# Patient Record
Sex: MALE | Race: WHITE | NOT HISPANIC OR LATINO | Employment: OTHER | URBAN - NONMETROPOLITAN AREA
[De-identification: names, ages, dates, MRNs, and addresses within clinical notes are randomized per-mention and may not be internally consistent; named-entity substitution may affect disease eponyms.]

---

## 2024-11-19 ENCOUNTER — HOSPITAL ENCOUNTER (OUTPATIENT)
Facility: HOSPITAL | Age: 74
Discharge: LEFT BEFORE DISCHARGE | End: 2024-11-20
Attending: EMERGENCY MEDICINE | Admitting: INTERNAL MEDICINE
Payer: MEDICARE

## 2024-11-19 ENCOUNTER — APPOINTMENT (OUTPATIENT)
Dept: CT IMAGING | Facility: HOSPITAL | Age: 74
End: 2024-11-19
Payer: MEDICARE

## 2024-11-19 DIAGNOSIS — K31.89 GASTRIC MASS: ICD-10-CM

## 2024-11-19 DIAGNOSIS — K92.0 COFFEE GROUND EMESIS: ICD-10-CM

## 2024-11-19 DIAGNOSIS — R93.3 ABNORMAL FINDING ON GI TRACT IMAGING: ICD-10-CM

## 2024-11-19 DIAGNOSIS — K31.89 GASTRIC WALL THICKENING: Primary | ICD-10-CM

## 2024-11-19 LAB
ALBUMIN SERPL-MCNC: 4 G/DL (ref 3.5–5.2)
ALBUMIN/GLOB SERPL: 1.6 G/DL
ALP SERPL-CCNC: 88 U/L (ref 39–117)
ALT SERPL W P-5'-P-CCNC: 6 U/L (ref 1–41)
ANION GAP SERPL CALCULATED.3IONS-SCNC: 13.2 MMOL/L (ref 5–15)
AST SERPL-CCNC: 16 U/L (ref 1–40)
BACTERIA UR QL AUTO: NORMAL /HPF
BASOPHILS # BLD AUTO: 0.05 10*3/MM3 (ref 0–0.2)
BASOPHILS NFR BLD AUTO: 0.9 % (ref 0–1.5)
BILIRUB SERPL-MCNC: 0.4 MG/DL (ref 0–1.2)
BILIRUB UR QL STRIP: NEGATIVE
BUN SERPL-MCNC: 10 MG/DL (ref 8–23)
BUN/CREAT SERPL: 15.6 (ref 7–25)
CALCIUM SPEC-SCNC: 9 MG/DL (ref 8.6–10.5)
CHLORIDE SERPL-SCNC: 101 MMOL/L (ref 98–107)
CLARITY UR: CLEAR
CO2 SERPL-SCNC: 22.8 MMOL/L (ref 22–29)
COLOR UR: YELLOW
CREAT SERPL-MCNC: 0.64 MG/DL (ref 0.76–1.27)
D-LACTATE SERPL-SCNC: 1.6 MMOL/L (ref 0.5–2)
DEPRECATED RDW RBC AUTO: 58.2 FL (ref 37–54)
EGFRCR SERPLBLD CKD-EPI 2021: 99.3 ML/MIN/1.73
EOSINOPHIL # BLD AUTO: 0.08 10*3/MM3 (ref 0–0.4)
EOSINOPHIL NFR BLD AUTO: 1.4 % (ref 0.3–6.2)
ERYTHROCYTE [DISTWIDTH] IN BLOOD BY AUTOMATED COUNT: 20 % (ref 12.3–15.4)
GLOBULIN UR ELPH-MCNC: 2.5 GM/DL
GLUCOSE SERPL-MCNC: 92 MG/DL (ref 65–99)
GLUCOSE UR STRIP-MCNC: ABNORMAL MG/DL
HCT VFR BLD AUTO: 43 % (ref 37.5–51)
HEMOCCULT STL QL: POSITIVE
HGB BLD-MCNC: 13.6 G/DL (ref 13–17.7)
HGB UR QL STRIP.AUTO: NEGATIVE
HOLD SPECIMEN: NORMAL
HOLD SPECIMEN: NORMAL
HYALINE CASTS UR QL AUTO: NORMAL /LPF
IMM GRANULOCYTES # BLD AUTO: 0.01 10*3/MM3 (ref 0–0.05)
IMM GRANULOCYTES NFR BLD AUTO: 0.2 % (ref 0–0.5)
KETONES UR QL STRIP: ABNORMAL
LEUKOCYTE ESTERASE UR QL STRIP.AUTO: NEGATIVE
LIPASE SERPL-CCNC: 31 U/L (ref 13–60)
LYMPHOCYTES # BLD AUTO: 1.63 10*3/MM3 (ref 0.7–3.1)
LYMPHOCYTES NFR BLD AUTO: 29 % (ref 19.6–45.3)
MCH RBC QN AUTO: 25.7 PG (ref 26.6–33)
MCHC RBC AUTO-ENTMCNC: 31.6 G/DL (ref 31.5–35.7)
MCV RBC AUTO: 81.1 FL (ref 79–97)
MONOCYTES # BLD AUTO: 0.67 10*3/MM3 (ref 0.1–0.9)
MONOCYTES NFR BLD AUTO: 11.9 % (ref 5–12)
NEUTROPHILS NFR BLD AUTO: 3.18 10*3/MM3 (ref 1.7–7)
NEUTROPHILS NFR BLD AUTO: 56.6 % (ref 42.7–76)
NITRITE UR QL STRIP: NEGATIVE
NRBC BLD AUTO-RTO: 0 /100 WBC (ref 0–0.2)
PH UR STRIP.AUTO: >=9 [PH] (ref 5–8)
PLATELET # BLD AUTO: 171 10*3/MM3 (ref 140–450)
PMV BLD AUTO: 9.9 FL (ref 6–12)
POTASSIUM SERPL-SCNC: 4.1 MMOL/L (ref 3.5–5.2)
PROT SERPL-MCNC: 6.5 G/DL (ref 6–8.5)
PROT UR QL STRIP: ABNORMAL
RBC # BLD AUTO: 5.3 10*6/MM3 (ref 4.14–5.8)
RBC # UR STRIP: NORMAL /HPF
REF LAB TEST METHOD: NORMAL
SODIUM SERPL-SCNC: 137 MMOL/L (ref 136–145)
SP GR UR STRIP: 1.02 (ref 1–1.03)
SQUAMOUS #/AREA URNS HPF: NORMAL /HPF
UROBILINOGEN UR QL STRIP: ABNORMAL
WBC # UR STRIP: NORMAL /HPF
WBC NRBC COR # BLD AUTO: 5.62 10*3/MM3 (ref 3.4–10.8)
WHOLE BLOOD HOLD COAG: NORMAL
WHOLE BLOOD HOLD SPECIMEN: NORMAL

## 2024-11-19 PROCEDURE — G0378 HOSPITAL OBSERVATION PER HR: HCPCS

## 2024-11-19 PROCEDURE — 83605 ASSAY OF LACTIC ACID: CPT | Performed by: EMERGENCY MEDICINE

## 2024-11-19 PROCEDURE — 82272 OCCULT BLD FECES 1-3 TESTS: CPT | Performed by: EMERGENCY MEDICINE

## 2024-11-19 PROCEDURE — 83690 ASSAY OF LIPASE: CPT | Performed by: EMERGENCY MEDICINE

## 2024-11-19 PROCEDURE — 99223 1ST HOSP IP/OBS HIGH 75: CPT | Performed by: FAMILY MEDICINE

## 2024-11-19 PROCEDURE — 80053 COMPREHEN METABOLIC PANEL: CPT | Performed by: EMERGENCY MEDICINE

## 2024-11-19 PROCEDURE — 74177 CT ABD & PELVIS W/CONTRAST: CPT

## 2024-11-19 PROCEDURE — 85025 COMPLETE CBC W/AUTO DIFF WBC: CPT | Performed by: EMERGENCY MEDICINE

## 2024-11-19 PROCEDURE — 81001 URINALYSIS AUTO W/SCOPE: CPT | Performed by: EMERGENCY MEDICINE

## 2024-11-19 PROCEDURE — 99285 EMERGENCY DEPT VISIT HI MDM: CPT | Performed by: EMERGENCY MEDICINE

## 2024-11-19 PROCEDURE — 96374 THER/PROPH/DIAG INJ IV PUSH: CPT

## 2024-11-19 PROCEDURE — 25510000001 IOPAMIDOL 61 % SOLUTION: Performed by: EMERGENCY MEDICINE

## 2024-11-19 RX ORDER — AMOXICILLIN 250 MG
2 CAPSULE ORAL 2 TIMES DAILY PRN
Status: DISCONTINUED | OUTPATIENT
Start: 2024-11-19 | End: 2024-11-20 | Stop reason: HOSPADM

## 2024-11-19 RX ORDER — SODIUM CHLORIDE 0.9 % (FLUSH) 0.9 %
10 SYRINGE (ML) INJECTION AS NEEDED
Status: DISCONTINUED | OUTPATIENT
Start: 2024-11-19 | End: 2024-11-20 | Stop reason: HOSPADM

## 2024-11-19 RX ORDER — ZOLPIDEM TARTRATE 5 MG/1
10 TABLET ORAL NIGHTLY PRN
Status: DISCONTINUED | OUTPATIENT
Start: 2024-11-19 | End: 2024-11-20 | Stop reason: HOSPADM

## 2024-11-19 RX ORDER — CLONAZEPAM 0.5 MG/1
0.5 TABLET ORAL 2 TIMES DAILY PRN
Status: DISCONTINUED | OUTPATIENT
Start: 2024-11-19 | End: 2024-11-20 | Stop reason: HOSPADM

## 2024-11-19 RX ORDER — SUCRALFATE 1 G/1
1 TABLET ORAL 4 TIMES DAILY
COMMUNITY

## 2024-11-19 RX ORDER — SODIUM CHLORIDE 0.9 % (FLUSH) 0.9 %
10 SYRINGE (ML) INJECTION EVERY 12 HOURS SCHEDULED
Status: DISCONTINUED | OUTPATIENT
Start: 2024-11-19 | End: 2024-11-20 | Stop reason: HOSPADM

## 2024-11-19 RX ORDER — ROSUVASTATIN CALCIUM 5 MG/1
10 TABLET, COATED ORAL NIGHTLY
COMMUNITY

## 2024-11-19 RX ORDER — SODIUM CHLORIDE 9 MG/ML
40 INJECTION, SOLUTION INTRAVENOUS AS NEEDED
Status: DISCONTINUED | OUTPATIENT
Start: 2024-11-19 | End: 2024-11-20 | Stop reason: HOSPADM

## 2024-11-19 RX ORDER — ACETAMINOPHEN 650 MG/1
650 SUPPOSITORY RECTAL EVERY 4 HOURS PRN
Status: DISCONTINUED | OUTPATIENT
Start: 2024-11-19 | End: 2024-11-20 | Stop reason: HOSPADM

## 2024-11-19 RX ORDER — ONDANSETRON 2 MG/ML
4 INJECTION INTRAMUSCULAR; INTRAVENOUS EVERY 6 HOURS PRN
Status: DISCONTINUED | OUTPATIENT
Start: 2024-11-19 | End: 2024-11-20 | Stop reason: HOSPADM

## 2024-11-19 RX ORDER — BISACODYL 5 MG/1
5 TABLET, DELAYED RELEASE ORAL DAILY PRN
Status: DISCONTINUED | OUTPATIENT
Start: 2024-11-19 | End: 2024-11-20 | Stop reason: HOSPADM

## 2024-11-19 RX ORDER — PANTOPRAZOLE SODIUM 40 MG/1
40 TABLET, DELAYED RELEASE ORAL DAILY
COMMUNITY

## 2024-11-19 RX ORDER — ESCITALOPRAM OXALATE 20 MG/1
20 TABLET ORAL EVERY EVENING
Status: DISCONTINUED | OUTPATIENT
Start: 2024-11-19 | End: 2024-11-19

## 2024-11-19 RX ORDER — ROSUVASTATIN CALCIUM 5 MG/1
10 TABLET, COATED ORAL NIGHTLY
Status: DISCONTINUED | OUTPATIENT
Start: 2024-11-19 | End: 2024-11-20 | Stop reason: HOSPADM

## 2024-11-19 RX ORDER — ESCITALOPRAM OXALATE 20 MG/1
20 TABLET ORAL NIGHTLY
Status: DISCONTINUED | OUTPATIENT
Start: 2024-11-19 | End: 2024-11-20 | Stop reason: HOSPADM

## 2024-11-19 RX ORDER — ZOLPIDEM TARTRATE 5 MG/1
10 TABLET ORAL NIGHTLY PRN
COMMUNITY

## 2024-11-19 RX ORDER — BISACODYL 10 MG
10 SUPPOSITORY, RECTAL RECTAL DAILY PRN
Status: DISCONTINUED | OUTPATIENT
Start: 2024-11-19 | End: 2024-11-20 | Stop reason: HOSPADM

## 2024-11-19 RX ORDER — PANTOPRAZOLE SODIUM 40 MG/10ML
40 INJECTION, POWDER, LYOPHILIZED, FOR SOLUTION INTRAVENOUS
Status: DISCONTINUED | OUTPATIENT
Start: 2024-11-19 | End: 2024-11-20 | Stop reason: HOSPADM

## 2024-11-19 RX ORDER — METOPROLOL SUCCINATE 25 MG/1
25 TABLET, EXTENDED RELEASE ORAL DAILY
Status: DISCONTINUED | OUTPATIENT
Start: 2024-11-19 | End: 2024-11-20 | Stop reason: HOSPADM

## 2024-11-19 RX ORDER — IOPAMIDOL 612 MG/ML
100 INJECTION, SOLUTION INTRAVASCULAR
Status: COMPLETED | OUTPATIENT
Start: 2024-11-19 | End: 2024-11-19

## 2024-11-19 RX ORDER — ACETAMINOPHEN 160 MG/5ML
650 SOLUTION ORAL EVERY 4 HOURS PRN
Status: DISCONTINUED | OUTPATIENT
Start: 2024-11-19 | End: 2024-11-20 | Stop reason: HOSPADM

## 2024-11-19 RX ORDER — CLONAZEPAM 0.5 MG/1
0.5 TABLET ORAL 2 TIMES DAILY PRN
COMMUNITY

## 2024-11-19 RX ORDER — ACETAMINOPHEN 325 MG/1
650 TABLET ORAL EVERY 4 HOURS PRN
Status: DISCONTINUED | OUTPATIENT
Start: 2024-11-19 | End: 2024-11-20 | Stop reason: HOSPADM

## 2024-11-19 RX ORDER — METOPROLOL SUCCINATE 25 MG/1
25 TABLET, EXTENDED RELEASE ORAL DAILY
COMMUNITY

## 2024-11-19 RX ORDER — ESCITALOPRAM OXALATE 5 MG/1
20 TABLET ORAL DAILY
COMMUNITY

## 2024-11-19 RX ORDER — POLYETHYLENE GLYCOL 3350 17 G/17G
17 POWDER, FOR SOLUTION ORAL DAILY PRN
Status: DISCONTINUED | OUTPATIENT
Start: 2024-11-19 | End: 2024-11-20 | Stop reason: HOSPADM

## 2024-11-19 RX ADMIN — IOPAMIDOL 100 ML: 612 INJECTION, SOLUTION INTRAVENOUS at 13:21

## 2024-11-19 RX ADMIN — Medication 10 ML: at 20:29

## 2024-11-19 RX ADMIN — METOPROLOL SUCCINATE 25 MG: 25 TABLET, EXTENDED RELEASE ORAL at 20:26

## 2024-11-19 RX ADMIN — ZOLPIDEM TARTRATE 10 MG: 5 TABLET ORAL at 20:27

## 2024-11-19 RX ADMIN — CLONAZEPAM 0.5 MG: 0.5 TABLET ORAL at 20:27

## 2024-11-19 RX ADMIN — ROSUVASTATIN CALCIUM 10 MG: 5 TABLET, COATED ORAL at 20:27

## 2024-11-19 RX ADMIN — PANTOPRAZOLE SODIUM 40 MG: 40 INJECTION, POWDER, FOR SOLUTION INTRAVENOUS at 17:58

## 2024-11-19 RX ADMIN — ESCITALOPRAM OXALATE 20 MG: 20 TABLET ORAL at 20:28

## 2024-11-19 NOTE — PLAN OF CARE
Problem: Adult Inpatient Plan of Care  Goal: Plan of Care Review  Outcome: Progressing  Goal: Patient-Specific Goal (Individualized)  Outcome: Progressing  Goal: Absence of Hospital-Acquired Illness or Injury  Outcome: Progressing  Intervention: Identify and Manage Fall Risk  Recent Flowsheet Documentation  Taken 11/19/2024 1639 by Chester Deutsch RN  Safety Promotion/Fall Prevention: safety round/check completed  Intervention: Prevent Skin Injury  Recent Flowsheet Documentation  Taken 11/19/2024 1639 by Chester Deutsch RN  Body Position:   tilted   left   position changed independently  Skin Protection: incontinence pads utilized  Goal: Optimal Comfort and Wellbeing  Outcome: Progressing  Intervention: Provide Person-Centered Care  Recent Flowsheet Documentation  Taken 11/19/2024 1639 by Chester Deutsch RN  Trust Relationship/Rapport:   care explained   choices provided   empathic listening provided   emotional support provided   questions answered   questions encouraged   reassurance provided   thoughts/feelings acknowledged  Goal: Readiness for Transition of Care  Outcome: Progressing  Intervention: Mutually Develop Transition Plan  Recent Flowsheet Documentation  Taken 11/19/2024 1614 by Chester Deutsch RN  Equipment Currently Used at Home: none  Transportation Anticipated: family or friend will provide  Patient/Family Anticipated Services at Transition: none  Patient/Family Anticipates Transition to: home with family     Problem: Hospitalized Older Adult  Goal: Optimal Coping  Outcome: Progressing  Intervention: Promote Psychosocial Wellbeing  Recent Flowsheet Documentation  Taken 11/19/2024 1639 by Chester Deutsch RN  Family/Support System Care: support provided  Goal: Improved Oral Intake  Outcome: Progressing  Goal: Adequate Sleep/Rest  Outcome: Progressing     Problem: Diabetes  Goal: Optimal Coping  Outcome: Progressing  Intervention: Support Wellbeing and Self-Management Success  Recent Flowsheet  Documentation  Taken 11/19/2024 1639 by Chester Deutsch, RN  Family/Support System Care: support provided  Goal: Optimal Functional Ability  Outcome: Progressing  Intervention: Optimize Functional Ability  Recent Flowsheet Documentation  Taken 11/19/2024 1639 by Chester Deutsch, RN  Activity Management: activity encouraged  Goal: Blood Glucose Level Within Target Range  Outcome: Progressing  Goal: Minimize Hypoglycemia Risk  Outcome: Progressing   Goal Outcome Evaluation:   New ED admit. VSS on RA. Possible EGD tomorrow, NPO after midnight. No complaints from patient at this time. Family remains at bedside.

## 2024-11-19 NOTE — H&P
[facility]      Name:  Raffaele Blanco   Age:  73 y.o.  Sex:  male  :  1950  MRN:  [id number]   Visit Number:  [id number]  Admission Date:  2024  Date Of Service:  24  Primary Care Physician:  Bernardino Santiago MD    Chief Complaint:     Coffee-ground emesis, dark stool    History Of Presenting Illness:      Patient is 73 years old male with a past medical history of diabetes and hypertension who presented to the ER with his daughter and niece with a chief complaint of coffee-ground emesis and dark stool for over the past 2 months.  Patient also reporting unintentional weight loss of over 30 pounds recently.  He had hernia surgery in an outside institution 3 months ago and has been declining since after the surgery.  He reports that he has chronic constipation for which he uses Linzess. Patient does describe vomiting initially coffee ground per patient's daughter.  Patient also endorsing feeling generally weak and fatigued. Denies any abdominal pain, fever or shortness of breath.  He has a history of anemia for which is PCP put him on iron pills and he was also treated for acid reflux.    On ER evaluation, his vitals are stable and afebrile on room air.  His hemoglobin stable at 13.6, CMP and CBC nonactionable.  Fecal occult blood positive.  CT abdomen pelvis showed Severe wall thickening of the gastric pylorus over a 5 cm length. Morphology favors neoplastic etiology over inflammatory. EGD highly recommended. No associated findings of gastric outlet obstruction.  Gastroenterology Dr. Mccord consulted by ER provider, recommended admission for EGD in a.m. n.p.o. after midnight.  Hospitalist consulted for admission.    Review Of Systems:    All systems were reviewed and negative except as mentioned in history of presenting illness, assessment and plan.    Past Medical History: Patient  has a past medical history of Diabetes mellitus and  "Hypertension.    Past Surgical History: Patient  has a past surgical history that includes Hernia repair; Fracture surgery; and Colonoscopy.    Social History: Patient  reports that he has never smoked. He has never used smokeless tobacco. He reports that he does not drink alcohol and does not use drugs.    Family History:  Patient's family history has been reviewed and found to be noncontributory.     Allergies:      Patient has no known allergies.    Home Medications:    Prior to Admission Medications       None          ED Medications:    Medications   sodium chloride 0.9 % flush 10 mL (has no administration in time range)   iopamidol (ISOVUE-300) 61 % injection 100 mL (100 mL Intravenous Given 11/19/24 1321)     Vital Signs:  Temp:  [97.6 °F (36.4 °C)] 97.6 °F (36.4 °C)  Heart Rate:  [68-96] 68  Resp:  [12-18] 12  BP: (101-118)/(67-94) 110/74        11/19/24  1058   Weight: 67.7 kg (149 lb 3.2 oz)     Body mass index is 23.37 kg/m².    Physical Exam:     Most recent vital Signs: /74   Pulse 68   Temp 97.6 °F (36.4 °C) (Oral)   Resp 12   Ht 170.2 cm (67\")   Wt 67.7 kg (149 lb 3.2 oz)   SpO2 99%   BMI 23.37 kg/m²     Physical Exam  Vitals and nursing note reviewed.   Constitutional:       General: He is not in acute distress.     Appearance: He is ill-appearing.   HENT:      Head: Normocephalic and atraumatic.      Nose: Nose normal.      Mouth/Throat:      Mouth: Mucous membranes are moist.   Eyes:      Extraocular Movements: Extraocular movements intact.      Conjunctiva/sclera: Conjunctivae normal.      Pupils: Pupils are equal, round, and reactive to light.   Cardiovascular:      Rate and Rhythm: Normal rate and regular rhythm.      Pulses: Normal pulses.      Heart sounds: Normal heart sounds.   Pulmonary:      Effort: Pulmonary effort is normal.      Breath sounds: Normal breath sounds. No wheezing or rhonchi.   Abdominal:      General: Bowel sounds are normal. There is no distension.      " Palpations: Abdomen is soft.      Tenderness: There is no abdominal tenderness.   Musculoskeletal:         General: Normal range of motion.      Cervical back: Normal range of motion and neck supple.      Right lower leg: No edema.      Left lower leg: No edema.   Skin:     General: Skin is warm and dry.      Findings: No rash.   Neurological:      General: No focal deficit present.      Mental Status: He is alert and oriented to person, place, and time. Mental status is at baseline.   Psychiatric:         Mood and Affect: Mood normal.         Behavior: Behavior normal.         Thought Content: Thought content normal.         Laboratory data:    I have reviewed the labs done in the emergency room.    Results from last 7 days   Lab Units 11/19/24  1151   SODIUM mmol/L 137   POTASSIUM mmol/L 4.1   CHLORIDE mmol/L 101   CO2 mmol/L 22.8   BUN mg/dL 10   CREATININE mg/dL 0.64*   CALCIUM mg/dL 9.0   BILIRUBIN mg/dL 0.4   ALK PHOS U/L 88   ALT (SGPT) U/L 6   AST (SGOT) U/L 16   GLUCOSE mg/dL 92     Results from last 7 days   Lab Units 11/19/24  1151   WBC 10*3/mm3 5.62   HEMOGLOBIN g/dL 13.6   HEMATOCRIT % 43.0   PLATELETS 10*3/mm3 171                     Results from last 7 days   Lab Units 11/19/24  1151   LIPASE U/L 31         Results from last 7 days   Lab Units 11/19/24  1200   COLOR UA  Yellow   GLUCOSE UA  500 mg/dL (2+)*   KETONES UA  15 mg/dL (1+)*   BLOOD UA  Negative   LEUKOCYTES UA  Negative   PH, URINE  >=9.0*   BILIRUBIN UA  Negative   UROBILINOGEN UA  1.0 E.U./dL   RBC UA /HPF None Seen   WBC UA /HPF None Seen       Pain Management Panel           No data to display                  Radiology:    CT Abdomen Pelvis With Contrast    Result Date: 11/19/2024  PROCEDURE: CT ABDOMEN PELVIS W CONTRAST-  TECHNIQUE: IV contrast enhanced exam  HISTORY:  rlq abd pain, dark stool, and coffee color vomit  COMPARISON: None.  FINDINGS:  ABDOMEN: Benign cyst is seen of the anterior liver dome. Remaining solid organs are  unremarkable. Marked wall thickening is noted of the gastric pylorus. The anterior wall measures up to 34 mm in thickness over a 50 mm length. This could be neoplastic or inflammatory. This results in narrowing of the pyloric channel. EGD correlation recommended.  There is no significant gastric distention. Small bowel is unremarkable. No adenopathy is present.  PELVIS: Moderate sigmoid diverticulosis is noted. Appendix is not visualized but no secondary findings of appendicitis are present. Bladder and prostate are unremarkable. Surgical changes of bilateral lower inguinal hernia repair are noted.      Severe wall thickening of the gastric pylorus over a 5 cm length. Morphology favors neoplastic etiology over inflammatory. EGD highly recommended. No associated findings of gastric outlet obstruction.  This study was performed with techniques to keep radiation doses as low as reasonably achievable (ALARA). Individualized dose reduction techniques using automated exposure control or adjustment of vA and/or kV according to the patient size were employed.  This report was signed and finalized on 11/19/2024 2:21 PM by Kevin Plummer MD.       Assessment:    Coffee-ground emesis/melena, POA  Severe wall thickening of gastric pylorus, favors neoplastic  Diabetes  Hypertension  Anemia  GERD    Plan:    Patient is admitted for further management and treatment.    Coffee-ground emesis/melena  Severe wall thickening of gastric pylorus  -Dr. Mccord with GI consulted, appreciate recommendations.  -Plan on EGD in a.m.  -Clear liquids today, n.p.o. after midnight  -Monitor hemoglobin and transfuse as indicated  -Continue PPI    -Continue home meds as warranted.  -Further orders as indicated per clinical course.  -Discussed management treatment plan with the patient and his daughter and niece at bedside.  Patient with full decision-making capacity, he does not want to be resuscitated or intubated.  CODE STATUS was ordered as  DNR/DNI per his wishes.    Risk Assessment: Moderate to high  DVT Prophylaxis: SCDs, avoid chemoprophylaxis with possible GI bleed  Code Status: DNR/DNI  Diet: Clear liquids, n.p.o. after midnight    Advance Care Planning   ACP discussion was held with the patient during this visit. Patient does not have an advance directive, information provided.       Melonie Hdz MD  11/19/24  15:43 EST    Dictated utilizing Dragon dictation.

## 2024-11-19 NOTE — ED PROVIDER NOTES
[facility]  Emergency Department Encounter  Emergency Medicine Physician Note     Pt Name:Raffaele Blanco  MRN: [id number]  Birthdate 1950  Date of evaluation: 11/19/2024  PCP:  Bernardino Santiago MD  Note Started: 11:55 AM EST      CHIEF COMPLAINT       Chief Complaint   Patient presents with    Black or Bloody Stool     Pt CO N/V and black or bloody stool. Pt states that he had hernia surgery approximately 3 months ago and has issues ever since.     Vomiting    Nausea       HISTORY OF PRESENT ILLNESS  (Location/Symptom, Timing/Onset, Context/Setting, Quality, Duration, Modifying Factors, Severity.)      Raffaele Blanco is a 73 y.o. male who presents with generalized abdominal pain, dark stool and dark vomiting that is been going on for multiple weeks, patient states is progressively worsened.  Patient states that everything is declined after he had hernia surgery at outside institution 3 months ago.  Patient describes some diarrhea some hard stool, states it is black in color and dark. Patient denies tarry or sticky like.  Patient does describe vomiting initially coffee ground per patient's daughter, has more mucus like.    PAST MEDICAL / SURGICAL / SOCIAL / FAMILY HISTORY     History reviewed. No pertinent past medical history.  No additional pertinent       History reviewed. No pertinent surgical history.  No additional pertinent       Social History     Socioeconomic History    Marital status: Unknown       History reviewed. No pertinent family history.    Allergies:  Patient has no known allergies.    Home Medications:  Prior to Admission medications    Not on File         REVIEW OF SYSTEMS       Review of Systems   Constitutional:  Negative for chills and fever.   Respiratory:  Negative for chest tightness and shortness of breath.    Cardiovascular:  Negative for chest pain.   Gastrointestinal:  Positive for abdominal pain, diarrhea, nausea and vomiting.  "  Genitourinary:  Negative for flank pain.   Neurological:  Negative for dizziness and light-headedness.       PHYSICAL EXAM      INITIAL VITALS:   /74   Pulse 68   Temp 97.6 °F (36.4 °C) (Oral)   Resp 12   Ht 170.2 cm (67\")   Wt 67.7 kg (149 lb 3.2 oz)   SpO2 99%   BMI 23.37 kg/m²     Physical Exam  Constitutional:       Appearance: Normal appearance.   HENT:      Head: Normocephalic and atraumatic.   Eyes:      Extraocular Movements: Extraocular movements intact.      Comments: Pale conjunctiva   Cardiovascular:      Rate and Rhythm: Normal rate and regular rhythm.   Pulmonary:      Effort: Pulmonary effort is normal.      Breath sounds: Normal breath sounds.   Abdominal:      General: Abdomen is flat.      Palpations: Abdomen is soft.      Tenderness: There is abdominal tenderness.      Comments: Surgical site appears noninfected.   Musculoskeletal:      Right lower leg: No edema.      Left lower leg: No edema.   Skin:     General: Skin is warm and dry.   Neurological:      General: No focal deficit present.      Mental Status: He is alert and oriented to person, place, and time.   Psychiatric:         Mood and Affect: Mood normal.         Behavior: Behavior normal.           DDX/DIAGNOSTIC RESULTS / EMERGENCY DEPARTMENT COURSE / MDM     Differential Diagnosis included but not limited: GI bleed, liver disease, cirrhosis, peptic ulcer, neoplastic disease    Diagnoses Considered but Do Not Suspect: Sepsis or severe infectious process.    Decision Rules/Scores utilized: N/A     Tests considered but not ordered and why:  N/A     MIPS: N/A     Code Status Discussion:  Not Discussed    Additional Patient Education Provided: None     Medical Decision Making    Medical Decision Making   patient is a 73-year-old male presenting with coffee-ground emesis and dark stools that have been going on for couple weeks.  Patient states that he had hernia surgery 3 months ago at outside institution and since then has " had declining status.  Patient has most of his workup in other institutions with nothing in care everywhere or historically here.  Patient does report having a colonoscopy many years ago.  Laboratory workup grossly normal with normal BUN, normal hemoglobin.  Concern for neoplasm in the gastric area noted.  Discussed patient's case with GI who would like to perform EGD tomorrow.  Did inform patient of the results of CT scan, did inform him to be n.p.o. at midnight.  Patient admitted to hospitalist group for further management and care and EGD tomorrow by GI    Problems Addressed:  Coffee ground emesis: complicated acute illness or injury  Gastric wall thickening: complicated acute illness or injury    Amount and/or Complexity of Data Reviewed  Labs: ordered. Decision-making details documented in ED Course.  Radiology: ordered and independent interpretation performed.     Details: Personally evaluated CT imaging, gastric wall thickening was noted, no signs of free air in the abdomen.  Discussion of management or test interpretation with external provider(s): Gastroenterology for further management.  Hospitalist for admission    Risk  Prescription drug management.  Decision regarding hospitalization.        See ED COURSE for additional MDM statements    EKG  None Performed     All EKG's are interpreted by the Emergency Department Physician who either signs or Co-signs this chart in the absence of a cardiologist.    Additional Scores                   EMERGENCY DEPARTMENT COURSE:    ED Course as of 11/19/24 1617   Tue Nov 19, 2024   1226 WBC: 5.62  Patient with no leukocytosis, stable hemoglobin.  Patient's BUN not elevated, low concern for slow transit GI bleed. [CR]   1517 Discussed patient's case with GI, they plan for EGD tomorrow.  They recommend admission due to concerning signs and symptoms for possible GI bleed versus malignancy. [CR]      ED Course User Index  [CR] Prince Ruffin DO        PROCEDURES:  None Performed   Procedures    DATA FOR LAB AND RADIOLOGY TESTS ORDERED BELOW ARE REVIEWED BY THE ED CLINICIAN:    RADIOLOGY: All x-rays, CT, MRI, and formal ultrasound images (except ED bedside ultrasound) are read by the radiologist, see reports below, unless otherwise noted in MDM or here.  Reports below are reviewed by myself.  CT Abdomen Pelvis With Contrast   Final Result   Severe wall thickening of the gastric pylorus over a 5 cm   length. Morphology favors neoplastic etiology over inflammatory. EGD   highly recommended. No associated findings of gastric outlet   obstruction.       This study was performed with techniques to keep radiation doses as low   as reasonably achievable (ALARA). Individualized dose reduction   techniques using automated exposure control or adjustment of vA and/or   kV according to the patient size were employed.       This report was signed and finalized on 11/19/2024 2:21 PM by Kevin Plummer MD.              LABS: Lab orders shown below, the results are reviewed by myself, and all abnormals are listed below.  Labs Reviewed   OCCULT BLOOD X 1, STOOL - Abnormal; Notable for the following components:       Result Value    Fecal Occult Blood Positive (*)     All other components within normal limits   COMPREHENSIVE METABOLIC PANEL - Abnormal; Notable for the following components:    Creatinine 0.64 (*)     All other components within normal limits    Narrative:     GFR Normal >60  Chronic Kidney Disease <60  Kidney Failure <15    The GFR formula is only valid for adults with stable renal function between ages 18 and 70.   URINALYSIS W/ MICROSCOPIC IF INDICATED (NO CULTURE) - Abnormal; Notable for the following components:    pH, UA >=9.0 (*)     Glucose,  mg/dL (2+) (*)     Ketones, UA 15 mg/dL (1+) (*)     Protein, UA 30 mg/dL (1+) (*)     All other components within normal limits   CBC WITH AUTO DIFFERENTIAL - Abnormal; Notable for the following components:     MCH 25.7 (*)     RDW 20.0 (*)     RDW-SD 58.2 (*)     All other components within normal limits   LIPASE - Normal   LACTIC ACID, PLASMA - Normal   RAINBOW DRAW    Narrative:     The following orders were created for panel order [place] Draw.  Procedure                               Abnormality         Status                     ---------                               -----------         ------                     Green Top (Gel)[[id number]]                                  Final result               Lavender Top[[id number]]                                     Final result               Gold Top - SST[[id number]]                                   Final result               Light Blue Top[[id number]]                                   Final result                 Please view results for these tests on the individual orders.   URINALYSIS, MICROSCOPIC ONLY   CBC AND DIFFERENTIAL    Narrative:     The following orders were created for panel order CBC & Differential.  Procedure                               Abnormality         Status                     ---------                               -----------         ------                     CBC Auto Differential[[id number]]        Abnormal            Final result                 Please view results for these tests on the individual orders.   GREEN TOP   LAVENDER TOP   GOLD TOP - SST   LIGHT BLUE TOP       Vitals Reviewed:    Vitals:    11/19/24 1216 11/19/24 1259 11/19/24 1401 11/19/24 1459   BP: 114/80 101/67 118/76 110/74   BP Location:       Patient Position:       Pulse: 76 80 70 68   Resp: 12      Temp:       TempSrc:       SpO2: 99% (!) 87% 98% 99%   Weight:       Height:           MEDICATIONS GIVEN TO PATIENT THIS ENCOUNTER:  Medications   sodium chloride 0.9 % flush 10 mL (has no administration in time range)   iopamidol (ISOVUE-300) 61 % injection 100 mL (100 mL Intravenous Given 11/19/24 1321)       CONSULTS:  IP CONSULT TO GASTROENTEROLOGY    CRITICAL CARE:  There  was significant risk of life threatening deterioration of patient's condition requiring my direct management. Critical care time 0 minutes, excluding any documented procedures.    FINAL IMPRESSION      1. Gastric wall thickening    2. Coffee ground emesis          DISPOSITION / PLAN     ED Disposition       ED Disposition   Decision to Admit    Condition   --    Comment   Level of Care: Med/Surg [1]   Diagnosis: Gastric mass [273520]   Admitting Physician: MITZY MERCHANT [[id number]]                 PATIENT REFERRED TO:  No follow-up provider specified.    DISCHARGE MEDICATIONS:     Medication List      You have not been prescribed any medications.         Electronically signed by Prince Ruffin DO, 11/19/24, 11:55 AM EST.    Emergency Medicine Physician  [facility]  (Please note that portions of thisnote were completed with a voice recognition program.  Efforts were made to edit the dictations but occasionally words are mis-transcribed.)       Prince Ruffin DO  11/19/24 5468

## 2024-11-19 NOTE — CASE MANAGEMENT/SOCIAL WORK
Dis[facility] Planning Assessment  [facility]     Patient Name: Raffaele Blanco  MRN: [id number]  Today's Date: 11/19/2024    Admit Date: 11/19/2024    Plan: The patient is awake and able to answer questions.  His daughter and niece are at bedside and he consents for them to be present for his DC plans.  He is a current patient of [facility] and gets his medications from [facility].  He does not use DME.  He denies the need for DME or services at DC.  At the time of DC the patient plans to return home with his wife.  Questions and concerns were addressed, KELLY delivered at the time of this conversation.  Will provide additional resources and information upon patient request.   Discharge Needs Assessment       Row Name 11/19/24 4317       Living Environment    People in Home spouse    Name(s) of People in Home Kay Blanco, Wife    Current Living Arrangements home    Duration at Residence 4 years    Potentially Unsafe Housing Conditions none    In the past 12 months has the electric, gas, oil, or water company threatened to shut off services in your home? No    Primary Care Provided by self    Provides Primary Care For no one    Family Caregiver if Needed none    Quality of Family Relationships helpful;involved;supportive    Able to Return to Prior Arrangements yes       Resource/Environmental Concerns    Resource/Environmental Concerns none    Transportation Concerns none       Transportation Needs    In the past 12 months, has lack of transportation kept you from medical appointments or from getting medications? no    In the past 12 months, has lack of transportation kept you from meetings, work, or from getting things needed for daily living? No       Food Insecurity    Within the past 12 months, you worried that your food would run out before you got the money to buy more. Never true    Within the past 12 months, the food you bought just didn't last and you didn't have money to get more. Never true        Transition Planning    Patient/Family Anticipates Transition to home with family    Patient/Family Anticipated Services at Transition none    Transportation Anticipated family or friend will provide       Discharge Needs Assessment    Readmission Within the Last 30 Days no previous admission in last 30 days    Equipment Currently Used at Home none    Concerns to be Addressed discharge planning    Do you want help finding or keeping work or a job? I do not need or want help    Do you want help with school or training? For example, starting or completing job training or getting a high school diploma, GED or equivalent No    Anticipated Changes Related to Illness none    Equipment Needed After Discharge none    Provided Post Acute Provider List? N/A    N/A Provider List Comment Patient plans to return home; no new needs at this time    Provided Post Acute Provider Quality & Resource List? N/A    N/A Quality & Resource List Comment Patient plans to return home; no new needs at this time    Offered/Gave Vendor List no                   Discharge Plan       Row Name 11/19/24 1718       Plan    Plan The patient is awake and able to answer questions.  His daughter and niece are at bedside and he consents for them to be present for his DC plans.  He is a current patient of [facility] and gets his medications from [facility].  He does not use DME.  He denies the need for DME or services at DC.  At the time of DC the patient plans to return home with his wife.  Questions and concerns were addressed, KELLY delivered at the time of this conversation.  Will provide additional resources and information upon patient request.    Patient/Family in Agreement with Plan yes    Provided Post Acute Provider List? N/A    N/A Provider List Comment Patient plans to return home; no new needs at this time    Provided Post Acute Provider Quality & Resource List? N/A    N/A Quality & Resource List Comment Patient plans to return  home; no new needs at this time    Plan Comments Patient denies need at this time    Final Discharge Disposition Code 01 - home or self-care    Final Note Patient plans to return home with his wife                  Continued Care and Services - Admitted Since 11/19/2024    No active coordination exists for this encounter.          Demographic Summary       Row Name 11/19/24 1716       General Information    Admission Type observation    Arrived From emergency department    Required Notices Provided Observation Status Notice    Referral Source admission list    Reason for Consult discharge planning    Preferred Language English       Contact Information    Permission Granted to Share Info With ;family/designee                   Functional Status       Row Name 11/19/24 1716       Functional Status    Usual Activity Tolerance good    Current Activity Tolerance moderate       Physical Activity    On average, how many days per week do you engage in moderate to strenuous exercise (like a brisk walk)? 0 days    On average, how many minutes do you engage in exercise at this level? 0 min    Number of minutes of exercise per week 0       Assessment of Health Literacy    How often do you have someone help you read hospital materials? Never    How often do you have problems learning about your medical condition because of difficulty understanding written information? Never    How often do you have a problem understanding what is told to you about your medical condition? Never    How confident are you filling out medical forms by yourself? Extremely    Health Literacy Excellent       Functional Status, IADL    Medications independent    Meal Preparation independent    Housekeeping independent    Laundry independent    Shopping independent    If for any reason you need help with day-to-day activities such as bathing, preparing meals, shopping, managing finances, etc., do you get the help you need? I don't need any  help       Mental Status    General Appearance WDL WDL       Mental Status Summary    Recent Changes in Mental Status/Cognitive Functioning no changes       Employment/    Employment Status retired                   Psychosocial       Row Name 11/19/24 1717       Values/Beliefs    Spiritual, Cultural Beliefs, Yazidism Practices, Values that Affect Care no       Behavior WDL    Behavior WDL WDL       Emotion Mood WDL    Emotion/Mood/Affect WDL WDL       Speech WDL    Speech WDL WDL       Perceptual State WDL    Perceptual State WDL WDL       Thought Process WDL    Thought Process WDL WDL       Intellectual Performance WDL    Intellectual Performance WDL WDL                   Abuse/Neglect       Row Name 11/19/24 1717       Personal Safety    Feels Unsafe at Home or Work/School no    Feels Threatened by Someone no    Does Anyone Try to Keep You From Having Contact with Others or Doing Things Outside Your Home? no    Physical Signs of Abuse Present no                   Legal    No documentation.                  Substance Abuse       Row Name 11/19/24 1717       Substance Use    Substance Use Status never used                   Patient Forms       Row Name 11/19/24 1721       Patient Forms    Patient Observation Letter Delivered    Delivered to Patient    Method of delivery In person                      Leticia Renteria RN

## 2024-11-20 ENCOUNTER — ANESTHESIA (OUTPATIENT)
Dept: GASTROENTEROLOGY | Facility: HOSPITAL | Age: 74
End: 2024-11-20
Payer: MEDICARE

## 2024-11-20 ENCOUNTER — ANESTHESIA EVENT (OUTPATIENT)
Dept: GASTROENTEROLOGY | Facility: HOSPITAL | Age: 74
End: 2024-11-20
Payer: MEDICARE

## 2024-11-20 VITALS
HEIGHT: 67 IN | BODY MASS INDEX: 22.84 KG/M2 | DIASTOLIC BLOOD PRESSURE: 78 MMHG | SYSTOLIC BLOOD PRESSURE: 116 MMHG | TEMPERATURE: 98 F | WEIGHT: 145.5 LBS | OXYGEN SATURATION: 97 % | HEART RATE: 72 BPM | RESPIRATION RATE: 16 BRPM

## 2024-11-20 LAB
ANION GAP SERPL CALCULATED.3IONS-SCNC: 9.7 MMOL/L (ref 5–15)
BUN SERPL-MCNC: 11 MG/DL (ref 8–23)
BUN/CREAT SERPL: 17.2 (ref 7–25)
CALCIUM SPEC-SCNC: 8.5 MG/DL (ref 8.6–10.5)
CHLORIDE SERPL-SCNC: 105 MMOL/L (ref 98–107)
CO2 SERPL-SCNC: 23.3 MMOL/L (ref 22–29)
CREAT SERPL-MCNC: 0.64 MG/DL (ref 0.76–1.27)
DEPRECATED RDW RBC AUTO: 56.9 FL (ref 37–54)
EGFRCR SERPLBLD CKD-EPI 2021: 99.3 ML/MIN/1.73
ERYTHROCYTE [DISTWIDTH] IN BLOOD BY AUTOMATED COUNT: 19.4 % (ref 12.3–15.4)
GLUCOSE BLDC GLUCOMTR-MCNC: 88 MG/DL (ref 70–130)
GLUCOSE SERPL-MCNC: 95 MG/DL (ref 65–99)
HCT VFR BLD AUTO: 36.9 % (ref 37.5–51)
HGB BLD-MCNC: 11.8 G/DL (ref 13–17.7)
MCH RBC QN AUTO: 25.8 PG (ref 26.6–33)
MCHC RBC AUTO-ENTMCNC: 32 G/DL (ref 31.5–35.7)
MCV RBC AUTO: 80.6 FL (ref 79–97)
PLATELET # BLD AUTO: 156 10*3/MM3 (ref 140–450)
PMV BLD AUTO: 9.5 FL (ref 6–12)
POTASSIUM SERPL-SCNC: 3.7 MMOL/L (ref 3.5–5.2)
RBC # BLD AUTO: 4.58 10*6/MM3 (ref 4.14–5.8)
SODIUM SERPL-SCNC: 138 MMOL/L (ref 136–145)
WBC NRBC COR # BLD AUTO: 5.4 10*3/MM3 (ref 3.4–10.8)

## 2024-11-20 PROCEDURE — 97161 PT EVAL LOW COMPLEX 20 MIN: CPT

## 2024-11-20 PROCEDURE — 96376 TX/PRO/DX INJ SAME DRUG ADON: CPT

## 2024-11-20 PROCEDURE — 88342 IMHCHEM/IMCYTCHM 1ST ANTB: CPT

## 2024-11-20 PROCEDURE — G0378 HOSPITAL OBSERVATION PER HR: HCPCS

## 2024-11-20 PROCEDURE — 82948 REAGENT STRIP/BLOOD GLUCOSE: CPT

## 2024-11-20 PROCEDURE — 25010000002 PROPOFOL 10 MG/ML EMULSION: Performed by: NURSE ANESTHETIST, CERTIFIED REGISTERED

## 2024-11-20 PROCEDURE — 97165 OT EVAL LOW COMPLEX 30 MIN: CPT

## 2024-11-20 PROCEDURE — 88305 TISSUE EXAM BY PATHOLOGIST: CPT

## 2024-11-20 PROCEDURE — 99203 OFFICE O/P NEW LOW 30 MIN: CPT | Performed by: INTERNAL MEDICINE

## 2024-11-20 PROCEDURE — 88341 IMHCHEM/IMCYTCHM EA ADD ANTB: CPT

## 2024-11-20 PROCEDURE — 99239 HOSP IP/OBS DSCHRG MGMT >30: CPT | Performed by: FAMILY MEDICINE

## 2024-11-20 PROCEDURE — 85027 COMPLETE CBC AUTOMATED: CPT | Performed by: FAMILY MEDICINE

## 2024-11-20 PROCEDURE — 88360 TUMOR IMMUNOHISTOCHEM/MANUAL: CPT

## 2024-11-20 PROCEDURE — 43239 EGD BIOPSY SINGLE/MULTIPLE: CPT | Performed by: INTERNAL MEDICINE

## 2024-11-20 PROCEDURE — 80048 BASIC METABOLIC PNL TOTAL CA: CPT | Performed by: FAMILY MEDICINE

## 2024-11-20 RX ORDER — PROPOFOL 10 MG/ML
VIAL (ML) INTRAVENOUS AS NEEDED
Status: DISCONTINUED | OUTPATIENT
Start: 2024-11-20 | End: 2024-11-20 | Stop reason: SURG

## 2024-11-20 RX ADMIN — PROPOFOL 40 MG: 10 INJECTION, EMULSION INTRAVENOUS at 14:31

## 2024-11-20 RX ADMIN — Medication 10 ML: at 07:54

## 2024-11-20 RX ADMIN — Medication 10 ML: at 09:20

## 2024-11-20 RX ADMIN — PANTOPRAZOLE SODIUM 40 MG: 40 INJECTION, POWDER, FOR SOLUTION INTRAVENOUS at 07:54

## 2024-11-20 NOTE — PLAN OF CARE
Problem: Adult Inpatient Plan of Care  Goal: Plan of Care Review  Outcome: Progressing  Goal: Patient-Specific Goal (Individualized)  Outcome: Progressing  Goal: Absence of Hospital-Acquired Illness or Injury  Outcome: Progressing  Intervention: Identify and Manage Fall Risk  Recent Flowsheet Documentation  Taken 11/20/2024 0200 by Adelaida Hunter RN  Safety Promotion/Fall Prevention:   activity supervised   assistive device/personal items within reach   clutter free environment maintained   fall prevention program maintained   lighting adjusted   muscle strengthening facilitated   nonskid shoes/slippers when out of bed   room organization consistent   safety round/check completed  Taken 11/20/2024 0000 by Adelaida Hunter RN  Safety Promotion/Fall Prevention:   activity supervised   assistive device/personal items within reach   clutter free environment maintained   fall prevention program maintained   lighting adjusted   muscle strengthening facilitated   nonskid shoes/slippers when out of bed   room organization consistent   safety round/check completed  Taken 11/19/2024 2200 by Adelaida Hunter RN  Safety Promotion/Fall Prevention:   safety round/check completed   room organization consistent   nonskid shoes/slippers when out of bed   lighting adjusted   clutter free environment maintained   assistive device/personal items within reach   activity supervised  Taken 11/19/2024 2000 by Adelaida Hunter RN  Safety Promotion/Fall Prevention:   activity supervised   assistive device/personal items within reach   clutter free environment maintained   fall prevention program maintained   lighting adjusted   muscle strengthening facilitated   nonskid shoes/slippers when out of bed   room organization consistent   safety round/check completed  Intervention: Prevent Skin Injury  Recent Flowsheet Documentation  Taken 11/20/2024 0200 by Adelaida Hunter RN  Body Position: position changed independently  Taken 11/20/2024 0000 by  Adelaida Hunter RN  Body Position: position changed independently  Taken 11/19/2024 2200 by Adelaida Hunter RN  Body Position: position changed independently  Taken 11/19/2024 2000 by Adelaida Hunter RN  Body Position: position changed independently  Skin Protection: incontinence pads utilized  Intervention: Prevent Infection  Recent Flowsheet Documentation  Taken 11/20/2024 0200 by Adelaida Hunter RN  Infection Prevention:   environmental surveillance performed   hand hygiene promoted   rest/sleep promoted   single patient room provided  Taken 11/20/2024 0000 by Adelaida Hunter RN  Infection Prevention:   environmental surveillance performed   hand hygiene promoted   rest/sleep promoted   single patient room provided  Taken 11/19/2024 2200 by Adelaida Hunter RN  Infection Prevention:   hand hygiene promoted   rest/sleep promoted   single patient room provided   environmental surveillance performed  Taken 11/19/2024 2000 by Adelaida Hunter RN  Infection Prevention:   environmental surveillance performed   hand hygiene promoted   rest/sleep promoted   single patient room provided  Goal: Optimal Comfort and Wellbeing  Outcome: Progressing  Goal: Readiness for Transition of Care  Outcome: Progressing     Problem: Hospitalized Older Adult  Goal: Optimal Coping  Outcome: Progressing  Goal: Improved Oral Intake  Outcome: Progressing  Goal: Adequate Sleep/Rest  Outcome: Progressing     Problem: Diabetes  Goal: Optimal Coping  Outcome: Progressing  Goal: Optimal Functional Ability  Outcome: Progressing  Intervention: Optimize Functional Ability  Recent Flowsheet Documentation  Taken 11/20/2024 0200 by Adelaida Hunter RN  Activity Assistance Provided: assistance, 1 person  Taken 11/20/2024 0000 by Adelaida Hunter RN  Activity Assistance Provided: assistance, 1 person  Taken 11/19/2024 2200 by Adelaida Hunter RN  Activity Assistance Provided: assistance, stand-by  Taken 11/19/2024 2000 by Adelaida Hunter RN  Activity Assistance  Provided: assistance, 1 person  Goal: Blood Glucose Level Within Target Range  Outcome: Progressing  Goal: Minimize Hypoglycemia Risk  Outcome: Progressing   Goal Outcome Evaluation:

## 2024-11-20 NOTE — ANESTHESIA POSTPROCEDURE EVALUATION
Patient: Raffaele Blanco    Procedure Summary       Date: 11/20/24 Room / Location: [facility] ENDOSCOPY 1 / [facility] ENDOSCOPY    Anesthesia Start: 1426 Anesthesia Stop: 1446    Procedure: ESOPHAGOGASTRODUODENOSCOPY WITH BIOPSY (Esophagus) Diagnosis:       Gastric wall thickening      Coffee ground emesis      Gastric mass      Abnormal finding on GI tract imaging      (Gastric wall thickening [K31.89])      (Coffee ground emesis [K92.0])      (Gastric mass [K31.89])      (Abnormal finding on GI tract imaging [R93.3])    Surgeons: Garrett Mccord MD Provider: Wicho Keller CRNA    Anesthesia Type: MAC ASA Status: 3            Anesthesia Type: MAC    Vitals  Vitals Value Taken Time   BP     Temp     Pulse 75 11/20/24 1445   Resp     SpO2 98 % 11/20/24 1445   Vitals shown include unfiled device data.        Post Anesthesia Care and Evaluation    Patient location during evaluation: bedside  Patient participation: complete - patient participated  Level of consciousness: awake  Pain score: 0  Pain management: adequate    Airway patency: patent  Anesthetic complications: No anesthetic complications  PONV Status: controlled  Cardiovascular status: acceptable and stable  Respiratory status: acceptable and room air  Hydration status: acceptable    Comments: See nursing documentation for post op vital signs

## 2024-11-20 NOTE — PAYOR COMM NOTE
"TO:HUMANA  FROM:COCO NICHOLAS RN PHONE [phone]  OBSERVATION NOTIFICATION  TAX ID [id number] [facility] [id number]    Raffaele Martinez (73 y.o. Male)       Date of Birth   1950    Social Security Number       Address   [address] [place] [address]    Home Phone   [phone]    MRN   [id number]       Gnosticist   Unknown    Marital Status   Unknown                            Admission Date   24    Admission Type   Emergency    Admitting Provider   Melonie Hdz MD    Attending Provider   Melonei Hdz MD    Department, Room/Bed   [facility] TELEMETRY 3, 306/1       Discharge Date       Discharge Disposition       Discharge Destination                                 Attending Provider: Melonie Hdz MD    Allergies: No Known Allergies    Isolation: None   Infection: None   Code Status: No CPR    Ht: 170.2 cm (67\")   Wt: 66 kg (145 lb 8.1 oz)    Admission Cmt: None   Principal Problem: Gastric mass [K31.89]                   Active Insurance as of 2024       Primary Coverage       Payor Plan Insurance Group Employer/Plan Group    HUMANA MEDICARE REPLACEMENT [employer] MED ADV GROUP [id number]       Payor Plan Address Payor Plan Phone Number Payor Plan Fax Number Effective Dates    [address] [address] [phone]  2019 - None Entered    [facility] [id number]         Subscriber Name Subscriber Birth Date Member ID       RAFFAELE MARTINEZ 1950 [id number]                     Emergency Contacts        (Rel.) Home Phone Work Phone Mobile Phone    BRYANT BASHIR (Daughter) [phone] -- --    LAY MARTINEZ (Spouse) [phone] -- --                 History & Physical        Melonie Hdz MD at [address] [facility]            [facility]   HISTORY AND PHYSICAL      Name:  Raffaele Martinez   Age:  73 y.o.  Sex:  male  :  1950  MRN:  [id number]   Visit Number:  [id number]  Admission Date:  2024  Date Of " Service:  11/19/24  Primary Care Physician:  Bernardino Santiago MD    Chief Complaint:     Coffee-ground emesis, dark stool    History Of Presenting Illness:      Patient is 73 years old male with a past medical history of diabetes and hypertension who presented to the ER with his daughter and niece with a chief complaint of coffee-ground emesis and dark stool for over the past 2 months.  Patient also reporting unintentional weight loss of over 30 pounds recently.  He had hernia surgery in an outside institution 3 months ago and has been declining since after the surgery.  He reports that he has chronic constipation for which he uses Linzess. Patient does describe vomiting initially coffee ground per patient's daughter.  Patient also endorsing feeling generally weak and fatigued. Denies any abdominal pain, fever or shortness of breath.  He has a history of anemia for which is PCP put him on iron pills and he was also treated for acid reflux.    On ER evaluation, his vitals are stable and afebrile on room air.  His hemoglobin stable at 13.6, CMP and CBC nonactionable.  Fecal occult blood positive.  CT abdomen pelvis showed Severe wall thickening of the gastric pylorus over a 5 cm length. Morphology favors neoplastic etiology over inflammatory. EGD highly recommended. No associated findings of gastric outlet obstruction.  Gastroenterology Dr. Mccord consulted by ER provider, recommended admission for EGD in a.m. n.p.o. after midnight.  Hospitalist consulted for admission.    Review Of Systems:    All systems were reviewed and negative except as mentioned in history of presenting illness, assessment and plan.    Past Medical History: Patient  has a past medical history of Diabetes mellitus and Hypertension.    Past Surgical History: Patient  has a past surgical history that includes Hernia repair; Fracture surgery; and Colonoscopy.    Social History: Patient  reports that he has never smoked. He has never used  "smokeless tobacco. He reports that he does not drink alcohol and does not use drugs.    Family History:  Patient's family history has been reviewed and found to be noncontributory.     Allergies:      Patient has no known allergies.    Home Medications:    Prior to Admission Medications       None          ED Medications:    Medications   sodium chloride 0.9 % flush 10 mL (has no administration in time range)   iopamidol (ISOVUE-300) 61 % injection 100 mL (100 mL Intravenous Given 11/19/24 1321)     Vital Signs:  Temp:  [97.6 °F (36.4 °C)] 97.6 °F (36.4 °C)  Heart Rate:  [68-96] 68  Resp:  [12-18] 12  BP: (101-118)/(67-94) 110/74        11/19/24  1058   Weight: 67.7 kg (149 lb 3.2 oz)     Body mass index is 23.37 kg/m².    Physical Exam:     Most recent vital Signs: /74   Pulse 68   Temp 97.6 °F (36.4 °C) (Oral)   Resp 12   Ht 170.2 cm (67\")   Wt 67.7 kg (149 lb 3.2 oz)   SpO2 99%   BMI 23.37 kg/m²     Physical Exam  Vitals and nursing note reviewed.   Constitutional:       General: He is not in acute distress.     Appearance: He is ill-appearing.   HENT:      Head: Normocephalic and atraumatic.      Nose: Nose normal.      Mouth/Throat:      Mouth: Mucous membranes are moist.   Eyes:      Extraocular Movements: Extraocular movements intact.      Conjunctiva/sclera: Conjunctivae normal.      Pupils: Pupils are equal, round, and reactive to light.   Cardiovascular:      Rate and Rhythm: Normal rate and regular rhythm.      Pulses: Normal pulses.      Heart sounds: Normal heart sounds.   Pulmonary:      Effort: Pulmonary effort is normal.      Breath sounds: Normal breath sounds. No wheezing or rhonchi.   Abdominal:      General: Bowel sounds are normal. There is no distension.      Palpations: Abdomen is soft.      Tenderness: There is no abdominal tenderness.   Musculoskeletal:         General: Normal range of motion.      Cervical back: Normal range of motion and neck supple.      Right lower leg: No " edema.      Left lower leg: No edema.   Skin:     General: Skin is warm and dry.      Findings: No rash.   Neurological:      General: No focal deficit present.      Mental Status: He is alert and oriented to person, place, and time. Mental status is at baseline.   Psychiatric:         Mood and Affect: Mood normal.         Behavior: Behavior normal.         Thought Content: Thought content normal.         Laboratory data:    I have reviewed the labs done in the emergency room.    Results from last 7 days   Lab Units 11/19/24  1151   SODIUM mmol/L 137   POTASSIUM mmol/L 4.1   CHLORIDE mmol/L 101   CO2 mmol/L 22.8   BUN mg/dL 10   CREATININE mg/dL 0.64*   CALCIUM mg/dL 9.0   BILIRUBIN mg/dL 0.4   ALK PHOS U/L 88   ALT (SGPT) U/L 6   AST (SGOT) U/L 16   GLUCOSE mg/dL 92     Results from last 7 days   Lab Units 11/19/24  1151   WBC 10*3/mm3 5.62   HEMOGLOBIN g/dL 13.6   HEMATOCRIT % 43.0   PLATELETS 10*3/mm3 171                     Results from last 7 days   Lab Units 11/19/24  1151   LIPASE U/L 31         Results from last 7 days   Lab Units 11/19/24  1200   COLOR UA  Yellow   GLUCOSE UA  500 mg/dL (2+)*   KETONES UA  15 mg/dL (1+)*   BLOOD UA  Negative   LEUKOCYTES UA  Negative   PH, URINE  >=9.0*   BILIRUBIN UA  Negative   UROBILINOGEN UA  1.0 E.U./dL   RBC UA /HPF None Seen   WBC UA /HPF None Seen       Pain Management Panel           No data to display                  Radiology:    CT Abdomen Pelvis With Contrast    Result Date: 11/19/2024  PROCEDURE: CT ABDOMEN PELVIS W CONTRAST-  TECHNIQUE: IV contrast enhanced exam  HISTORY:  rlq abd pain, dark stool, and coffee color vomit  COMPARISON: None.  FINDINGS:  ABDOMEN: Benign cyst is seen of the anterior liver dome. Remaining solid organs are unremarkable. Marked wall thickening is noted of the gastric pylorus. The anterior wall measures up to 34 mm in thickness over a 50 mm length. This could be neoplastic or inflammatory. This results in narrowing of the pyloric  channel. EGD correlation recommended.  There is no significant gastric distention. Small bowel is unremarkable. No adenopathy is present.  PELVIS: Moderate sigmoid diverticulosis is noted. Appendix is not visualized but no secondary findings of appendicitis are present. Bladder and prostate are unremarkable. Surgical changes of bilateral lower inguinal hernia repair are noted.      Severe wall thickening of the gastric pylorus over a 5 cm length. Morphology favors neoplastic etiology over inflammatory. EGD highly recommended. No associated findings of gastric outlet obstruction.  This study was performed with techniques to keep radiation doses as low as reasonably achievable (ALARA). Individualized dose reduction techniques using automated exposure control or adjustment of vA and/or kV according to the patient size were employed.  This report was signed and finalized on 11/19/2024 2:21 PM by Kevin Plummer MD.       Assessment:    Coffee-ground emesis/melena, POA  Severe wall thickening of gastric pylorus, favors neoplastic  Diabetes  Hypertension  Anemia  GERD    Plan:    Patient is admitted for further management and treatment.    Coffee-ground emesis/melena  Severe wall thickening of gastric pylorus  -Dr. Mccord with GI consulted, appreciate recommendations.  -Plan on EGD in a.m.  -Clear liquids today, n.p.o. after midnight  -Monitor hemoglobin and transfuse as indicated  -Continue PPI    -Continue home meds as warranted.  -Further orders as indicated per clinical course.  -Discussed management treatment plan with the patient and his daughter and niece at bedside.  Patient with full decision-making capacity, he does not want to be resuscitated or intubated.  CODE STATUS was ordered as DNR/DNI per his wishes.    Risk Assessment: Moderate to high  DVT Prophylaxis: SCDs, avoid chemoprophylaxis with possible GI bleed  Code Status: DNR/DNI  Diet: Clear liquids, n.p.o. after midnight    Advance Care Planning  ACP  discussion was held with the patient during this visit. Patient does not have an advance directive, information provided.       Melonie Hdz MD  11/19/24  15:43 EST    Dictated utilizing Dragon dictation.    Electronically signed by Melonie Hdz MD at 11/19/24 1754       Vital Signs (last day)       Date/Time Temp Temp src Pulse Resp BP Patient Position SpO2    11/20/24 0707 98.5 (36.9) Oral 69 16 102/68 Lying --    11/19/24 1920 98 (36.7) Oral 86 16 107/80 Lying 98    11/19/24 1630 97.8 (36.6) Oral 70 14 133/78 Lying 100    11/19/24 1459 -- -- 68 -- 110/74 -- 99    11/19/24 14:01:07 -- -- 70 -- 118/76 -- 98    11/19/24 1259 -- -- 80 -- 101/67 -- 87    11/19/24 12:16:21 -- -- 76 12 114/80 -- 99    11/19/24 1058 97.6 (36.4) Oral 96 18 115/94 Sitting 99          Current Facility-Administered Medications   Medication Dose Route Frequency Provider Last Rate Last Admin    acetaminophen (TYLENOL) tablet 650 mg  650 mg Oral Q4H PRN Melonie Hdz MD        Or    acetaminophen (TYLENOL) 160 MG/5ML oral solution 650 mg  650 mg Oral Q4H PRN Melonie Hdz MD        Or    acetaminophen (TYLENOL) suppository 650 mg  650 mg Rectal Q4H PRN Melonie Hdz MD        sennosides-docusate (PERICOLACE) 8.6-50 MG per tablet 2 tablet  2 tablet Oral BID PRN Melonie Hdz MD        And    polyethylene glycol (MIRALAX) packet 17 g  17 g Oral Daily PRN Melonie Hdz MD        And    bisacodyl (DULCOLAX) EC tablet 5 mg  5 mg Oral Daily PRN Melonie Hdz MD        And    bisacodyl (DULCOLAX) suppository 10 mg  10 mg Rectal Daily PRN Melonie Hdz MD        clonazePAM (KlonoPIN) tablet 0.5 mg  0.5 mg Oral BID PRN Melonie Hdz MD   0.5 mg at 11/19/24 2027    escitalopram (LEXAPRO) tablet 20 mg  20 mg Oral Nightly Melonie Hdz MD   20 mg at 11/19/24 2028    Magnesium Standard Dose Replacement - Follow Nurse / BPA Driven Protocol   Not Applicable PRN Melonie Hdz MD        metoprolol succinate XL (TOPROL-XL) 24  hr tablet 25 mg  25 mg Oral Daily Melonie Hdz MD   25 mg at 11/19/24 2026    ondansetron (ZOFRAN) injection 4 mg  4 mg Intravenous Q6H PRN Melonie Hdz MD        pantoprazole (PROTONIX) injection 40 mg  40 mg Intravenous BID AC Melonie Hdz MD   40 mg at 11/20/24 0754    Potassium Replacement - Follow Nurse / BPA Driven Protocol   Not Applicable PRN Melonie Hdz MD        rosuvastatin (CRESTOR) tablet 10 mg  10 mg Oral Nightly Melonie Hdz MD   10 mg at 11/19/24 2027    sodium chloride 0.9 % flush 10 mL  10 mL Intravenous PRN Melonie Hdz MD        sodium chloride 0.9 % flush 10 mL  10 mL Intravenous Q12H Melonie Hdz MD   10 mL at 11/19/24 2029    sodium chloride 0.9 % flush 10 mL  10 mL Intravenous PRN Melonie Hdz MD   10 mL at 11/20/24 0754    sodium chloride 0.9 % infusion 40 mL  40 mL Intravenous PRN Melonie Hdz MD        zolpidem (AMBIEN) tablet 10 mg  10 mg Oral Nightly PRN Melonie Hdz MD   10 mg at 11/19/24 2027     Lab Results (last 24 hours)       Procedure Component Value Units Date/Time    Basic Metabolic Panel [[id number]]  (Abnormal) Collected: 11/20/24 0625    Specimen: Blood Updated: 11/20/24 0711     Glucose 95 mg/dL      BUN 11 mg/dL      Creatinine 0.64 mg/dL      Sodium 138 mmol/L      Potassium 3.7 mmol/L      Chloride 105 mmol/L      CO2 23.3 mmol/L      Calcium 8.5 mg/dL      BUN/Creatinine Ratio 17.2     Anion Gap 9.7 mmol/L      eGFR 99.3 mL/min/1.73     Narrative:      GFR Normal >60  Chronic Kidney Disease <60  Kidney Failure <15    The GFR formula is only valid for adults with stable renal function between ages 18 and 70.    CBC (No Diff) [[id number]]  (Abnormal) Collected: 11/20/24 0625    Specimen: Blood Updated: 11/20/24 0649     WBC 5.40 10*3/mm3      RBC 4.58 10*6/mm3      Hemoglobin 11.8 g/dL      Hematocrit 36.9 %      MCV 80.6 fL      MCH 25.8 pg      MCHC 32.0 g/dL      RDW 19.4 %      RDW-SD 56.9 fl      MPV 9.5 fL      Platelets 156  10*3/mm3     Occult Blood X 1, Stool - Stool, Per Rectum [[id number]]  (Abnormal) Collected: 11/19/24 1256    Specimen: Stool from Per Rectum Updated: 11/19/24 1323     Fecal Occult Blood Positive    Urinalysis, Microscopic Only - Urine, Clean Catch [[id number]] Collected: 11/19/24 1200    Specimen: Urine, Clean Catch Updated: 11/19/24 1221     RBC, UA None Seen /HPF      WBC, UA None Seen /HPF      Bacteria, UA None Seen /HPF      Squamous Epithelial Cells, UA 0-2 /HPF      Hyaline Casts, UA None Seen /LPF      Methodology Manual Light Microscopy    Comprehensive Metabolic Panel [[id number]]  (Abnormal) Collected: 11/19/24 1151    Specimen: Blood Updated: 11/19/24 1220     Glucose 92 mg/dL      BUN 10 mg/dL      Creatinine 0.64 mg/dL      Sodium 137 mmol/L      Potassium 4.1 mmol/L      Comment: Slight hemolysis detected by analyzer. Result may be falsely elevated.        Chloride 101 mmol/L      CO2 22.8 mmol/L      Calcium 9.0 mg/dL      Total Protein 6.5 g/dL      Albumin 4.0 g/dL      ALT (SGPT) 6 U/L      AST (SGOT) 16 U/L      Alkaline Phosphatase 88 U/L      Total Bilirubin 0.4 mg/dL      Globulin 2.5 gm/dL      A/G Ratio 1.6 g/dL      BUN/Creatinine Ratio 15.6     Anion Gap 13.2 mmol/L      eGFR 99.3 mL/min/1.73     Narrative:      GFR Normal >60  Chronic Kidney Disease <60  Kidney Failure <15    The GFR formula is only valid for adults with stable renal function between ages 18 and 70.    Lipase [[id number]]  (Normal) Collected: 11/19/24 1151    Specimen: Blood Updated: 11/19/24 1220     Lipase 31 U/L     Lactic Acid, Plasma [[id number]]  (Normal) Collected: 11/19/24 1151    Specimen: Blood Updated: 11/19/24 1219     Lactate 1.6 mmol/L     Urinalysis With Microscopic If Indicated (No Culture) - Urine, Clean Catch [[id number]]  (Abnormal) Collected: 11/19/24 1200    Specimen: Urine, Clean Catch Updated: 11/19/24 1211     Color, UA Yellow     Appearance, UA Clear     pH, UA >=9.0     Specific Reform, UA 1.025      Glucose,  mg/dL (2+)     Ketones, UA 15 mg/dL (1+)     Bilirubin, UA Negative     Blood, UA Negative     Protein, UA 30 mg/dL (1+)     Leuk Esterase, UA Negative     Nitrite, UA Negative     Urobilinogen, UA 1.0 E.U./dL    [place] Draw [[id number]] Collected: 11/19/24 1151    Specimen: Blood Updated: 11/19/24 1200    Narrative:      The following orders were created for panel order [place] Draw.  Procedure                               Abnormality         Status                     ---------                               -----------         ------                     Green Top (Gel)[[id number]]                                  Final result               Lavender Top[[id number]]                                     Final result               Gold Top - SST[[id number]]                                   Final result               Light Blue Top[[id number]]                                   Final result                 Please view results for these tests on the individual orders.    Green Top (Gel) [[id number]] Collected: 11/19/24 1151    Specimen: Blood Updated: 11/19/24 1200     Extra Tube Hold for add-ons.     Comment: Auto resulted.       Lavender Top [[id number]] Collected: 11/19/24 1151    Specimen: Blood Updated: 11/19/24 1200     Extra Tube hold for add-on     Comment: Auto resulted       Gold Top - SST [[id number]] Collected: 11/19/24 1151    Specimen: Blood Updated: 11/19/24 1200     Extra Tube Hold for add-ons.     Comment: Auto resulted.       Light Blue Top [[id number]] Collected: 11/19/24 1151    Specimen: Blood Updated: 11/19/24 1200     Extra Tube Hold for add-ons.     Comment: Auto resulted       CBC & Differential [[id number]]  (Abnormal) Collected: 11/19/24 1151    Specimen: Blood Updated: 11/19/24 1159    Narrative:      The following orders were created for panel order CBC & Differential.  Procedure                               Abnormality         Status                     ---------                                -----------         ------                     CBC Auto Differential[[id number]]        Abnormal            Final result                 Please view results for these tests on the individual orders.    CBC Auto Differential [[id number]]  (Abnormal) Collected: 11/19/24 1151    Specimen: Blood Updated: 11/19/24 1159     WBC 5.62 10*3/mm3      RBC 5.30 10*6/mm3      Hemoglobin 13.6 g/dL      Hematocrit 43.0 %      MCV 81.1 fL      MCH 25.7 pg      MCHC 31.6 g/dL      RDW 20.0 %      RDW-SD 58.2 fl      MPV 9.9 fL      Platelets 171 10*3/mm3      Neutrophil % 56.6 %      Lymphocyte % 29.0 %      Monocyte % 11.9 %      Eosinophil % 1.4 %      Basophil % 0.9 %      Immature Grans % 0.2 %      Neutrophils, Absolute 3.18 10*3/mm3      Lymphocytes, Absolute 1.63 10*3/mm3      Monocytes, Absolute 0.67 10*3/mm3      Eosinophils, Absolute 0.08 10*3/mm3      Basophils, Absolute 0.05 10*3/mm3      Immature Grans, Absolute 0.01 10*3/mm3      nRBC 0.0 /100 WBC           Imaging Results (Last 24 Hours)       Procedure Component Value Units Date/Time    CT Abdomen Pelvis With Contrast [[id number]] Collected: 11/19/24 1421     Updated: 11/19/24 1423    Narrative:      PROCEDURE: CT ABDOMEN PELVIS W CONTRAST-     TECHNIQUE: IV contrast enhanced exam     HISTORY:  rlq abd pain, dark stool, and coffee color vomit     COMPARISON: None.     FINDINGS:     ABDOMEN: Benign cyst is seen of the anterior liver dome. Remaining solid  organs are unremarkable. Marked wall thickening is noted of the gastric  pylorus. The anterior wall measures up to 34 mm in thickness over a 50  mm length. This could be neoplastic or inflammatory. This results in  narrowing of the pyloric channel. EGD correlation recommended.     There is no significant gastric distention. Small bowel is unremarkable.  No adenopathy is present.     PELVIS: Moderate sigmoid diverticulosis is noted. Appendix is not  visualized but no secondary findings of appendicitis are  present.  Bladder and prostate are unremarkable. Surgical changes of bilateral  lower inguinal hernia repair are noted.       Impression:      Severe wall thickening of the gastric pylorus over a 5 cm  length. Morphology favors neoplastic etiology over inflammatory. EGD  highly recommended. No associated findings of gastric outlet  obstruction.     This study was performed with techniques to keep radiation doses as low  as reasonably achievable (ALARA). Individualized dose reduction  techniques using automated exposure control or adjustment of vA and/or  kV according to the patient size were employed.     This report was signed and finalized on 11/19/2024 2:21 PM by Kevin Plummer MD.             Physician Progress Notes (last 24 hours)  Notes from 11/19/24 0804 through 11/20/24 0804   No notes of this type exist for this encounter.       Consult Notes (last 24 hours)  Notes from 11/19/24 0804 through 11/20/24 0804   No notes of this type exist for this encounter.

## 2024-11-20 NOTE — CONSULTS
"Dietitian Assessment    Patient Name: Raffaele Blanco  YOB: 1950  MRN: [id number]  Admission date: 11/19/2024    Comment:    Clinical Nutrition Assessment      Reason for Assessment NSG/MST 3   H&P  Past Medical History:   Diagnosis Date    Diabetes mellitus     Hypertension        Past Surgical History:   Procedure Laterality Date    COLONOSCOPY      FRACTURE SURGERY      HERNIA REPAIR              Current Problems   Coffee ground emesis and dark stool  Recent unintentional weight loss of 30lbs     Encounter Information        Trending Narrative     11/20: Pt currently NPO d/t coffee ground emesis - GI consulted. Unsure of recent weight loss due to limited weight history per chart review.      Anthropometrics        Current Height, Weight Height: 170.2 cm (67\")  Weight: 66 kg (145 lb 8.1 oz) (11/19/24 1630)   Trending Weight Hx     This admission:              PTA:     Wt Readings from Last 30 Encounters:   11/19/24 1630 66 kg (145 lb 8.1 oz)   11/19/24 1058 67.7 kg (149 lb 3.2 oz)      BMI kg/m2 Body mass index is 22.79 kg/m².     Labs        Pertinent Labs     Results from last 7 days   Lab Units 11/20/24  0625 11/19/24  1151   SODIUM mmol/L 138 137   POTASSIUM mmol/L 3.7 4.1   CHLORIDE mmol/L 105 101   CO2 mmol/L 23.3 22.8   BUN mg/dL 11 10   CREATININE mg/dL 0.64* 0.64*   CALCIUM mg/dL 8.5* 9.0   BILIRUBIN mg/dL  --  0.4   ALK PHOS U/L  --  88   ALT (SGPT) U/L  --  6   AST (SGOT) U/L  --  16   GLUCOSE mg/dL 95 92       Results from last 7 days   Lab Units 11/20/24  0625   HEMOGLOBIN g/dL 11.8*   HEMATOCRIT % 36.9*       No results found for: \"HGBA1C\"         Medications       Scheduled Medications escitalopram, 20 mg, Oral, Nightly  metoprolol succinate XL, 25 mg, Oral, Daily  pantoprazole, 40 mg, Intravenous, BID AC  rosuvastatin, 10 mg, Oral, Nightly  sodium chloride, 10 mL, Intravenous, Q12H        Infusions       PRN Medications   acetaminophen **OR** acetaminophen **OR** acetaminophen    " senna-docusate sodium **AND** polyethylene glycol **AND** bisacodyl **AND** bisacodyl    clonazePAM    Magnesium Standard Dose Replacement - Follow Nurse / BPA Driven Protocol    ondansetron    Potassium Replacement - Follow Nurse / BPA Driven Protocol    sodium chloride    sodium chloride    sodium chloride    zolpidem     Physical Findings        Trending Physical   Appearance, NFPE    --  Edema  None reported    Bowel Function None reported    Tubes Peripheral IV    Chewing/Swallowing npo   Skin WNL      Estimated/Assessed Needs       Energy Requirements    EST Needs, Method, Wt used 1650-1980kcals/day using 25-30kcals/kg       Protein Requirements    EST Needs, Method, Wt used 66-79g protein per day using 1-1.2g/kg       Fluid Requirements     Estimated Needs (mL/day) 1980mL per day        Current Nutrition Orders & Evaluation of Intake       Oral Nutrition     Food Allergies    Current PO Diet NPO Diet NPO Type: Strict NPO   Supplement    PO Evaluation     Trending % PO Intake NPO     Enteral Nutrition    Enteral Route    Order, Modulars, Flushes    Residual/Tolerance    TF Observation         Parenteral Nutrition     TPN Route    Total # Days on TPN    TPN Order, Lipid Details    MVI & Trace Element Freq    TPN Observation       Nutrition Diagnosis         Nutrition Dx Problem 1 Altered GI function r/t coffee-ground emesis as evidenced by NPO diet regimen      Nutrition Dx Problem 2        Intervention Goal         Intervention Goal(s) Diet advancement when/if medically appropriate     Nutrition Intervention        RD Action No action at this time      Nutrition Prescription          Diet Prescription NPO   Supplement Prescription      Enteral Prescription        TPN Prescription      Monitor/Evaluation        Monitor Per protocol, PO intake, Pertinent labs, Weight, Skin status, GI status, Symptoms, Swallow function     RD to follow-up.     Electronically signed by:  Pro Treviño RD  11/20/24 09:08 EST

## 2024-11-20 NOTE — CASE MANAGEMENT/SOCIAL WORK
DCP; Home with family. Pt is not medically stable for discharge at this time. CM continues to follow for any needs.

## 2024-11-20 NOTE — CONSULTS
In Patient Consult      Date of Consultation: 2024  Patient Name: Raffaele Blanco  MRN: [id number]  : 1950     Referring provider: Melonie Hdz MD    Primary care provider:  Bernardino Santiago MD    Reason for consultation: Weight loss, coffee-ground emesis, abnormal GI imaging, gastric thickening.    History of Present Illness:     73-year-old man seen as an inpatient consultation for abnormal GI imaging.  Underwent a CT scan as part of the evaluation for abdominal pain, weight loss.  Noted to have a abnormal appearance to the stomach, along with a thickened pylorus.  The differential included malignancy.    States that he has had unintentional weight loss of over 30 pounds.  Had hernia surgery at an outside institution a few months ago, details not clear.  Complains of coffee-ground emesis.  Is also noticed some dark stool as well.        Subjective     Past Medical History:   Diagnosis Date   • Diabetes mellitus    • Hypertension        Past Surgical History:   Procedure Laterality Date   • COLONOSCOPY     • FRACTURE SURGERY     • HERNIA REPAIR         History reviewed. No pertinent family history.    Social History     Socioeconomic History   • Marital status: Unknown   Tobacco Use   • Smoking status: Never   • Smokeless tobacco: Never   Vaping Use   • Vaping status: Never Used   Substance and Sexual Activity   • Alcohol use: Never   • Drug use: Never   • Sexual activity: Defer         Current Facility-Administered Medications:   •  acetaminophen (TYLENOL) tablet 650 mg, 650 mg, Oral, Q4H PRN **OR** acetaminophen (TYLENOL) 160 MG/5ML oral solution 650 mg, 650 mg, Oral, Q4H PRN **OR** acetaminophen (TYLENOL) suppository 650 mg, 650 mg, Rectal, Q4H PRN, Melonie Hdz MD  •  sennosides-docusate (PERICOLACE) 8.6-50 MG per tablet 2 tablet, 2 tablet, Oral, BID PRN **AND** polyethylene glycol (MIRALAX) packet 17 g, 17 g, Oral, Daily PRN **AND** bisacodyl (DULCOLAX) EC tablet 5 mg,  5 mg, Oral, Daily PRN **AND** bisacodyl (DULCOLAX) suppository 10 mg, 10 mg, Rectal, Daily PRN, Melonie Hdz MD  •  clonazePAM (KlonoPIN) tablet 0.5 mg, 0.5 mg, Oral, BID PRN, Melonie Hdz MD, 0.5 mg at 11/19/24 2027  •  [Transfer Hold] escitalopram (LEXAPRO) tablet 20 mg, 20 mg, Oral, Nightly, Melonie Hdz MD, 20 mg at 11/19/24 2028  •  Magnesium Standard Dose Replacement - Follow Nurse / BPA Driven Protocol, , Not Applicable, PRN, Melonie Hdz MD  •  metoprolol succinate XL (TOPROL-XL) 24 hr tablet 25 mg, 25 mg, Oral, Daily, Melonie Hdz MD, 25 mg at 11/19/24 2026  •  ondansetron (ZOFRAN) injection 4 mg, 4 mg, Intravenous, Q6H PRN, Melonie Hdz MD  •  pantoprazole (PROTONIX) injection 40 mg, 40 mg, Intravenous, BID AC, Melonie Hdz MD, 40 mg at 11/20/24 0754  •  Potassium Replacement - Follow Nurse / BPA Driven Protocol, , Not Applicable, PRMADDY, Melonie Hdz MD  •  rosuvastatin (CRESTOR) tablet 10 mg, 10 mg, Oral, Nightly, Melonie Hdz MD, 10 mg at 11/19/24 2027  •  sodium chloride 0.9 % flush 10 mL, 10 mL, Intravenous, PRN, Melonie Hdz MD  •  sodium chloride 0.9 % flush 10 mL, 10 mL, Intravenous, Q12H, Melonie Hdz MD, 10 mL at 11/20/24 0920  •  sodium chloride 0.9 % flush 10 mL, 10 mL, Intravenous, PRN, Melonie Hdz MD, 10 mL at 11/20/24 0754  •  sodium chloride 0.9 % infusion 40 mL, 40 mL, Intravenous, PRN, Melonie Hdz MD  •  zolpidem (AMBIEN) tablet 10 mg, 10 mg, Oral, Nightly PRN, Melonie Hdz MD, 10 mg at 11/19/24 2027    No Known Allergies    Review of Systems  See HPI above.  Otherwise negative.    The following portions of the patient's history were reviewed and updated as appropriate: allergies, current medications, past family history, past medical history, past social history, past surgical history and problem list.    Objective     Vitals:    11/20/24 0500 11/20/24 0707 11/20/24 0903 11/20/24 1103   BP:  102/68     BP Location:  Left arm      Patient Position:  Lying     Pulse:  69 72 71   Resp:  16  16   Temp: Comment: pt refused vitals all night 98.5 °F (36.9 °C)  97.6 °F (36.4 °C)   TempSrc:  Oral  Axillary   SpO2:    93%   Weight:       Height:           Physical Exam  Constitutional:       General: He is not in acute distress.     Appearance: Normal appearance. He is ill-appearing.   HENT:      Head: Normocephalic and atraumatic.   Eyes:      General: No scleral icterus.     Conjunctiva/sclera: Conjunctivae normal.   Cardiovascular:      Rate and Rhythm: Normal rate.   Pulmonary:      Effort: Pulmonary effort is normal. No respiratory distress.   Abdominal:      General: There is no distension.      Tenderness: There is no abdominal tenderness.   Musculoskeletal:         General: No deformity or signs of injury.   Skin:     Coloration: Skin is not jaundiced or pale.   Neurological:      General: No focal deficit present.      Mental Status: He is alert and oriented to person, place, and time.   Psychiatric:         Mood and Affect: Mood normal.         Behavior: Behavior normal.         Results from last 7 days   Lab Units 11/20/24  0625 11/19/24  1151   SODIUM mmol/L 138 137   POTASSIUM mmol/L 3.7 4.1   CHLORIDE mmol/L 105 101   CO2 mmol/L 23.3 22.8   BUN mg/dL 11 10   CREATININE mg/dL 0.64* 0.64*   CALCIUM mg/dL 8.5* 9.0   ALBUMIN g/dL  --  4.0   BILIRUBIN mg/dL  --  0.4   ALK PHOS U/L  --  88   ALT (SGPT) U/L  --  6   AST (SGOT) U/L  --  16   GLUCOSE mg/dL 95 92   WBC 10*3/mm3 5.40 5.62   HEMOGLOBIN g/dL 11.8* 13.6   PLATELETS 10*3/mm3 156 171       Imaging Results (Last 24 Hours)       Procedure Component Value Units Date/Time    CT Abdomen Pelvis With Contrast [[id number]] Collected: 11/19/24 1421     Updated: 11/19/24 1423    Narrative:      PROCEDURE: CT ABDOMEN PELVIS W CONTRAST-     TECHNIQUE: IV contrast enhanced exam     HISTORY:  rlq abd pain, dark stool, and coffee color vomit     COMPARISON: None.     FINDINGS:     ABDOMEN: Benign  cyst is seen of the anterior liver dome. Remaining solid  organs are unremarkable. Marked wall thickening is noted of the gastric  pylorus. The anterior wall measures up to 34 mm in thickness over a 50  mm length. This could be neoplastic or inflammatory. This results in  narrowing of the pyloric channel. EGD correlation recommended.     There is no significant gastric distention. Small bowel is unremarkable.  No adenopathy is present.     PELVIS: Moderate sigmoid diverticulosis is noted. Appendix is not  visualized but no secondary findings of appendicitis are present.  Bladder and prostate are unremarkable. Surgical changes of bilateral  lower inguinal hernia repair are noted.       Impression:      Severe wall thickening of the gastric pylorus over a 5 cm  length. Morphology favors neoplastic etiology over inflammatory. EGD  highly recommended. No associated findings of gastric outlet  obstruction.     This study was performed with techniques to keep radiation doses as low  as reasonably achievable (ALARA). Individualized dose reduction  techniques using automated exposure control or adjustment of vA and/or  kV according to the patient size were employed.     This report was signed and finalized on 11/19/2024 2:21 PM by Kevin Plummer MD.               Assessment / Plan      Assessment/Recommendations:   Principal Problem:    Gastric mass  Active Problems:    Gastric wall thickening    Coffee ground emesis    Abnormal finding on GI tract imaging    Broad differential diagnosis.  Includes but is not limited to peptic ulcer disease, gastritis, however given the abnormal appearance, malignancy is high on the differential.    Will perform an EGD and obtain tissue for testing.    Plan for EGD with monitored anesthesia care. Counseled in detail regarding the risks, benefits and alternatives of the procedure, including but not limited to perforation, bleeding, infection, post-operative pain, complications from  anesthesia, aspiration, cardiac decompensation, need for further procedures or surgery, which may occur in approximately 1 in 1000 procedures. Counseled also that endoscopies are not perfect tests, and there is a possibility of missed diagnoses. Counseled regarding pre procedural instructions. Written instructions provided. Instructed to arrange for a ride home for the day of procedure. Patient voiced understanding of the above, and agrees to proceed.    Thank you very much for letting me participate in the care of this patient.  Please do not hesitate to call me if you have any questions.      For the purpose of the procedure he will be a full code, but once we have we are done, he can go back to the DNR once he is cleared from anesthesia and has woken up. He is agreeable to the above.        Garrett Mccord MD  [facility] [place]  11/20/2024  14:20 EST    Part of this note may be an electronic transcription/translation of spoken language to printed text using the Dragon Dictation System.

## 2024-11-20 NOTE — NURSING NOTE
Pt signed out AMA, Provider called, and came to bedside to talk to pt. Pt still refused and Signed the AMA.

## 2024-11-20 NOTE — THERAPY DISCHARGE NOTE
Acute Care - Occupational Therapy Discharge  [facility]    Patient Name: Raffaele Blanco  : 1950    MRN: [id number]                              Today's Date: 2024       Admit Date: 2024    Visit Dx:     ICD-10-CM ICD-9-CM   1. Gastric wall thickening  K31.89 537.89   2. Coffee ground emesis  K92.0 578.0   3. Gastric mass  K31.89 537.89   4. Abnormal finding on GI tract imaging  R93.3 793.4     Patient Active Problem List   Diagnosis    Gastric mass    Gastric wall thickening    Coffee ground emesis    Abnormal finding on GI tract imaging     Past Medical History:   Diagnosis Date    Diabetes mellitus     Hypertension      Past Surgical History:   Procedure Laterality Date    COLONOSCOPY      FRACTURE SURGERY      HERNIA REPAIR        General Information       [facility] Name 24 1258          OT Time and Intention    Subjective Information no complaints  -     Document Type discharge evaluation/summary  -     Mode of Treatment occupational therapy  -     Patient Effort good  -     Symptoms Noted During/After Treatment none  -[facility] Name 24 1258          General Information    Patient Profile Reviewed yes  -     Prior Level of Function independent:;ADL's;community mobility  -     Existing Precautions/Restrictions fall  -[facility] Name 24 1258          Living Environment    People in Home spouse  -[facility] Name 24 1258          Home Main Entrance    Number of Stairs, Main Entrance four  -     Stair Railings, Main Entrance railing on left side (ascending)  -[facility] Name 24 1258          Stairs Within Home, Primary    Stairs, Within Home, Primary has a basement at home, normally able to go up/down stairs without any problem  -[facility] Name 24 1258          Cognition    Orientation Status (Cognition) oriented x 4  -[facility] Name 24 1258          Safety Issues/Impairments Affecting Functional Mobility    Safety Issues  Affecting Function (Mobility) safety precaution awareness;safety precautions follow-through/compliance  -               User Key  (r) = Recorded By, (t) = Taken By, (c) = Cosigned By      Initials Name Provider Type     Mikaela Vargas Occupational Therapist                   Mobility/ADL's       Row Name 11/20/24 1308          Bed Mobility    Bed Mobility bed mobility (all) activities  -     All Activities, [place] (Bed Mobility) modified independence  -     Assistive Device (Bed Mobility) head of bed elevated  -       Row Name 11/20/24 1308          Transfers    Transfers sit-stand transfer  -       Row Name 11/20/24 1308          Sit-Stand Transfer    Sit-Stand [place] (Transfers) standby assist  -     Assistive Device (Sit-Stand Transfers) other (see comments)  -     Comment, (Sit-Stand Transfer) gait belt  -[facility] Name 11/20/24 1308          Functional Mobility    Functional Mobility- Ind. Level standby assist  -     Functional Mobility-Distance (Feet) 164  -     Functional Mobility- Comment used gait belt  -     Patient was able to Ambulate yes  -[facility] Name 11/20/24 1308          Activities of Daily Living    BADL Assessment/Intervention bathing;upper body dressing;lower body dressing;grooming;feeding;toileting  -[facility] Name 11/20/24 1308          Bathing Assessment/Intervention    [place] Level (Bathing) independent  -[facility] Name 11/20/24 1308          Upper Body Dressing Assessment/Training    [place] Level (Upper Body Dressing) independent  -[facility] Name 11/20/24 1308          Lower Body Dressing Assessment/Training    [place] Level (Lower Body Dressing) independent  -[facility] Name 11/20/24 1308          Grooming Assessment/Training    [place] Level (Grooming) independent  [facility] Name 11/20/24 1308          Self-Feeding Assessment/Training    [place] Level (Feeding) independent  -[facility] Name 11/20/24 1308           Toileting Assessment/Training    [place] Level (Toileting) independent  -               User Key  (r) = Recorded By, (t) = Taken By, (c) = Cosigned By      Initials Name Provider Type    Mikaela Matthew Occupational Therapist                   Obj/Interventions       Row Name 11/20/24 1310          Sensory Assessment (Somatosensory)    Sensory Assessment (Somatosensory) sensation intact  -AH       Row Name 11/20/24 1310          Vision Assessment/Intervention    Visual Impairment/Limitations WFL  -AH       Row Name 11/20/24 1310          Range of Motion Comprehensive    General Range of Motion bilateral upper extremity ROM L  -AH       Row Name 11/20/24 1310          Strength Comprehensive (MMT)    Comment, General Manual Muscle Testing (MMT) Assessment [facility]               User Key  (r) = Recorded By, (t) = Taken By, (c) = Cosigned By      Initials Name Provider Type    Mikaela Matthew Occupational Therapist                   Goals/Plan    No documentation.                  Clinical Impression       Row Name 11/20/24 1310          Pain Assessment    Pretreatment Pain Rating 0/10 - no pain  -     Posttreatment Pain Rating 0/10 - no pain  -AH       Row Name 11/20/24 1310          Plan of Care Review    Plan of Care Reviewed With patient;family  -     Progress no change  -     Outcome Evaluation Pt seen for OT evaluation today.  Pt lives at home with his wife and is normally independent with self care and mobility tasks.  Pt does report having some falls in the past, but family feels the falls may be related to his BP or his heart.  Pt was able to sit eob independently, stood independently and walked 164' with sba.  Pt is independent with self care tasks and feels he is at his baseline and has no skilled OT needs at this time.  OT will sign off at this time.  -AH       Row Name 11/20/24 1310          Therapy Assessment/Plan (OT)    Patient/Family Therapy Goal Statement (OT) d/c home   -     Therapy Frequency (OT) evaluation only  -       Row Name 11/20/24 1310          Therapy Plan Review/Discharge Plan (OT)    Anticipated Discharge Disposition (OT) home  -       Row Name 11/20/24 1310          Positioning and Restraints    Pre-Treatment Position in bed  -     Post Treatment Position chair  -     In Chair sitting;call light within reach;encouraged to call for assist;with family/caregiver;notified nsg  -               User Key  (r) = Recorded By, (t) = Taken By, (c) = Cosigned By      Initials Name Provider Type    Mikaela Matthew Occupational Therapist                   Outcome Measures       Row Name 11/20/24 1322          How much help from another is currently needed...    Putting on and taking off regular lower body clothing? 4  -AH     Bathing (including washing, rinsing, and drying) 4  -AH     Toileting (which includes using toilet bed pan or urinal) 4  -AH     Putting on and taking off regular upper body clothing 4  -AH     Taking care of personal grooming (such as brushing teeth) 4  -AH     Eating meals 4  -     AM-PAC 6 Clicks Score (OT) 24  -       Row Name 11/20/24 1322          Functional Assessment    Outcome Measure Options AM-PAC 6 Clicks Daily Activity (OT)  -               User Key  (r) = Recorded By, (t) = Taken By, (c) = Cosigned By      Initials Name Provider Type    Mikaela Matthew Occupational Therapist                  Occupational Therapy Education       Title: PT OT SLP Therapies (Done)       Topic: Occupational Therapy (Done)       Point: ADL training (Done)       Description:   Instruct learner(s) on proper safety adaptation and remediation techniques during self care or transfers.   Instruct in proper use of assistive devices.                  Learning Progress Summary            Patient Acceptance, E,TB, VU by  at 11/20/2024 1322    Comment: Role of OT                                      User Key       Initials Effective Dates Name Provider  Type Discipline     06/16/21 -  Mikaela Vargas Occupational Therapist OT                  OT Recommendation and Plan  Therapy Frequency (OT): evaluation only  Plan of Care Review  Plan of Care Reviewed With: patient, family  Progress: no change  Outcome Evaluation: Pt seen for OT evaluation today.  Pt lives at home with his wife and is normally independent with self care and mobility tasks.  Pt does report having some falls in the past, but family feels the falls may be related to his BP or his heart.  Pt was able to sit eob independently, stood independently and walked 164' with sba.  Pt is independent with self care tasks and feels he is at his baseline and has no skilled OT needs at this time.  OT will sign off at this time.  Plan of Care Reviewed With: patient, family  Outcome Evaluation: Pt seen for OT evaluation today.  Pt lives at home with his wife and is normally independent with self care and mobility tasks.  Pt does report having some falls in the past, but family feels the falls may be related to his BP or his heart.  Pt was able to sit eob independently, stood independently and walked 164' with sba.  Pt is independent with self care tasks and feels he is at his baseline and has no skilled OT needs at this time.  OT will sign off at this time.     Time Calculation:   Evaluation Complexity (OT)  Review Occupational Profile/Medical/Therapy History Complexity: brief/low complexity  Assessment, Occupational Performance/Identification of Deficit Complexity: 1-3 performance deficits  Clinical Decision Making Complexity (OT): problem focused assessment/low complexity  Overall Complexity of Evaluation (OT): low complexity     Time Calculation- OT       Row Name 11/20/24 1323             Time Calculation- OT    OT Start Time 0947  -      OT Received On 11/20/24  -         Untimed Charges    OT Eval/Re-eval Minutes 45  -         Total Minutes    Untimed Charges Total Minutes 45  -       Total Minutes 45   -                User Key  (r) = Recorded By, (t) = Taken By, (c) = Cosigned By      Initials Name Provider Type    Mikaela Matthew Occupational Therapist                  Therapy Charges for Today       Code Description Service Date Service Provider Modifiers Qty    95330940505 HC OT EVAL LOW COMPLEXITY 3 11/20/2024 Mikaela Vargas GO 1               OT Discharge Summary  Anticipated Discharge Disposition (OT): home    Mikaela Vargas  11/20/2024

## 2024-11-20 NOTE — PROGRESS NOTES
[facility]    Name:  Raffaele Blanco   Age:  73 y.o.  Sex:  male  :  1950  MRN:  [id number]   Visit Number:  [id number]  Admission Date:  2024  Date Of Service:  24  Primary Care Physician:  Bernardino Santiago MD     LOS: 0 days :    Chief Complaint:      Coffee-ground emesis, dark stool     Subjective:    Patient was seen examined at bedside today.  Family at bedside including his daughter and wife.  Patient sitting up comfortably in the chair with no distress.  He has been belching but denies any vomiting.  No pain or discomfort.  His only complaint today is being hungry and currently fasting for EGD later today.  Discussed management of treatment plan, hemoglobin dropped to 11.8 today.  Vitals otherwise stable.    Hospital Course:    Patient is 73 years old male with a past medical history of diabetes and hypertension who presented to the ER with his daughter and niece with a chief complaint of coffee-ground emesis and dark stool for over the past 2 months.  Patient also reporting unintentional weight loss of over 30 pounds recently.  He had hernia surgery in an outside institution 3 months ago and has been declining since after the surgery.  He reports that he has chronic constipation for which he uses Linzess. Patient does describe vomiting initially coffee ground per patient's daughter.  Patient also endorsing feeling generally weak and fatigued. Denies any abdominal pain, fever or shortness of breath.  He has a history of anemia for which is PCP put him on iron pills and he was also treated for acid reflux.     On ER evaluation, his vitals are stable and afebrile on room air.  His hemoglobin stable at 13.6, CMP and CBC nonactionable.  Fecal occult blood positive.  CT abdomen pelvis showed Severe wall thickening of the gastric pylorus over a 5 cm length. Morphology favors neoplastic etiology over inflammatory. EGD highly recommended. No  associated findings of gastric outlet obstruction.  Gastroenterology Dr. Mccord consulted by ER provider, recommended admission for EGD in a.m. n.p.o. after midnight.  Hospitalist consulted for admission.    Review of Systems:     All systems were reviewed and negative except as mentioned in subjective, assessment and plan.    Vital Signs:    Temp:  [97.6 °F (36.4 °C)-98.5 °F (36.9 °C)] 98.5 °F (36.9 °C)  Heart Rate:  [68-96] 69  Resp:  [12-18] 16  BP: (101-133)/(67-94) 102/68    Intake and output:    No intake/output data recorded.  No intake/output data recorded.    Physical Examination:    General Appearance:  Alert and cooperative. NAD   Head:  Atraumatic and normocephalic.   Eyes: Conjunctivae and sclerae normal, no icterus. No pallor.   Throat: No oral lesions, no thrush, oral mucosa moist.   Neck: Supple, trachea midline, no thyromegaly.   Lungs:   Breath sounds heard bilaterally equally.  No wheezing or crackles. No Pleural rub or bronchial breathing.   Heart:  Normal S1 and S2, no murmur, no gallop, no rub. No JVD.   Abdomen:   Normal bowel sounds, no masses, no organomegaly. Soft, nontender, nondistended, no rebound tenderness.   Extremities: Supple, no edema, no cyanosis, no clubbing.   Skin: No bleeding or rash.   Neurologic: Alert and oriented x 3. No facial asymmetry. Moves all four limbs. No tremors.      Laboratory results:    Results from last 7 days   Lab Units 11/20/24  0625 11/19/24  1151   SODIUM mmol/L 138 137   POTASSIUM mmol/L 3.7 4.1   CHLORIDE mmol/L 105 101   CO2 mmol/L 23.3 22.8   BUN mg/dL 11 10   CREATININE mg/dL 0.64* 0.64*   CALCIUM mg/dL 8.5* 9.0   BILIRUBIN mg/dL  --  0.4   ALK PHOS U/L  --  88   ALT (SGPT) U/L  --  6   AST (SGOT) U/L  --  16   GLUCOSE mg/dL 95 92     Results from last 7 days   Lab Units 11/20/24  0625 11/19/24  1151   WBC 10*3/mm3 5.40 5.62   HEMOGLOBIN g/dL 11.8* 13.6   HEMATOCRIT % 36.9* 43.0   PLATELETS 10*3/mm3 156 171                 No results for input(s):  "\"PHART\", \"JXA7YGL\", \"PO2ART\", \"YYK0GQU\", \"BASEEXCESS\" in the last 8760 hours.   I have reviewed the patient's laboratory results.    Radiology results:    CT Abdomen Pelvis With Contrast    Result Date: 11/19/2024  PROCEDURE: CT ABDOMEN PELVIS W CONTRAST-  TECHNIQUE: IV contrast enhanced exam  HISTORY:  rlq abd pain, dark stool, and coffee color vomit  COMPARISON: None.  FINDINGS:  ABDOMEN: Benign cyst is seen of the anterior liver dome. Remaining solid organs are unremarkable. Marked wall thickening is noted of the gastric pylorus. The anterior wall measures up to 34 mm in thickness over a 50 mm length. This could be neoplastic or inflammatory. This results in narrowing of the pyloric channel. EGD correlation recommended.  There is no significant gastric distention. Small bowel is unremarkable. No adenopathy is present.  PELVIS: Moderate sigmoid diverticulosis is noted. Appendix is not visualized but no secondary findings of appendicitis are present. Bladder and prostate are unremarkable. Surgical changes of bilateral lower inguinal hernia repair are noted.      Impression: Severe wall thickening of the gastric pylorus over a 5 cm length. Morphology favors neoplastic etiology over inflammatory. EGD highly recommended. No associated findings of gastric outlet obstruction.  This study was performed with techniques to keep radiation doses as low as reasonably achievable (ALARA). Individualized dose reduction techniques using automated exposure control or adjustment of vA and/or kV according to the patient size were employed.  This report was signed and finalized on 11/19/2024 2:21 PM by Kevin Plummer MD.     I have reviewed the patient's radiology reports.    Medication Review:     I have reviewed the patient's active and prn medications.     Problem List:      Gastric mass    Gastric wall thickening    Coffee ground emesis    Abnormal finding on GI tract imaging      Assessment:    Coffee-ground emesis/melena, " POA  Severe wall thickening of gastric pylorus, favors neoplastic  Diabetes  Hypertension  Anemia  GERD    Plan:    Coffee-ground emesis/melena  Severe wall thickening of gastric pylorus  -Dr. Mccord with GI consulted, appreciate recommendations.  -Plan on EGD later today, patient is n.p.o.  -Hg dropped down to 11.8 from 13.6  -Monitor hemoglobin and transfuse as indicated  -Continue PPI     -Continue home meds as warranted.  -Further orders as indicated per clinical course.  -Discussed management treatment plan with the patient and his family at bedside.     DVT Prophylaxis: SCDs, avoid chemoprophylaxis with possible GI bleed  Code Status: DNR/DNI  Diet: n.p.o. since midnight  Discharge Plan: LUCI Hdz MD  11/20/24  07:57 EST    Dictated utilizing Dragon dictation.

## 2024-11-20 NOTE — DISCHARGE SUMMARY
[facility]   DISCHARGE SUMMARY      Name:  Raffaele Blanco   Age:  73 y.o.  Sex:  male  :  1950  MRN:  [id number]   Visit Number:  [id number]    Admission Date:  2024  Date of Discharge:  2024  Primary Care Physician:  Bernardino Santiago MD    Important issues to note:     Patient unfortunately left the hospital AGAINST MEDICAL ADVICE    Discharge Diagnoses:     Coffee-ground emesis/melena, POA  Severe wall thickening of gastric pylorus, favors neoplastic  Gastric mass  Diabetes  Hypertension  Anemia  GERD    Problem List:     Active Hospital Problems    Diagnosis  POA    **Gastric mass [K31.89]  Yes    Gastric wall thickening [K31.89]  Unknown    Coffee ground emesis [K92.0]  Unknown    Abnormal finding on GI tract imaging [R93.3]  Unknown      Resolved Hospital Problems   No resolved problems to display.     Presenting Problem:    Chief Complaint   Patient presents with    Black or Bloody Stool     Pt CO N/V and black or bloody stool. Pt states that he had hernia surgery approximately 3 months ago and has issues ever since.     Vomiting    Nausea      Consults:     Consulting Physician(s)         Provider   Role Specialty     Garrett Mccord MD      Consulting Physician Gastroenterology          Procedures Performed:    Procedure(s):  ESOPHAGOGASTRODUODENOSCOPY WITH BIOPSY    History of presenting illness/Hospital Course:    Patient is 73 years old male with a past medical history of diabetes and hypertension who presented to the ER with his daughter and niece with a chief complaint of coffee-ground emesis and dark stool for over the past 2 months.  Patient also reporting unintentional weight loss of over 30 pounds recently.  He had hernia surgery in an outside institution 3 months ago and has been declining since after the surgery.  He reports that he has chronic constipation for which he uses Linzess. Patient does describe vomiting initially coffee ground  per patient's daughter.  Patient also endorsing feeling generally weak and fatigued. Denies any abdominal pain, fever or shortness of breath.  He has a history of anemia for which is PCP put him on iron pills and he was also treated for acid reflux.     On ER evaluation, his vitals are stable and afebrile on room air.  His hemoglobin stable at 13.6, CMP and CBC nonactionable.  Fecal occult blood positive.  CT abdomen pelvis showed Severe wall thickening of the gastric pylorus over a 5 cm length. Morphology favors neoplastic etiology over inflammatory. EGD highly recommended. No associated findings of gastric outlet obstruction.  Gastroenterology Dr. Mccord consulted by ER provider, recommended admission for EGD in a.m. n.p.o. after midnight.  Hospitalist consulted for admission.    Coffee-ground emesis/melena  Severe wall thickening of gastric pylorus  Gastric mass  Possible GI bleed  -Dr. Mccord with GI consulted, appreciate recommendations.  -Hg dropped down to 11.8 from 13.6  -Monitor hemoglobin and transfuse as indicated  -Continue PPI  -EGD was performed by Dr. Mccord on 11/20, showed there is a large gastric mass located in the antrum, corresponding to CT findings. Unable to traverse past this area. Multiple cold forceps biopsies obtained from the antral mass for histology. Await pathology. Findings were discussed with the patient's family.   -I discussed with Dr. Mccord who recommended patient needs to stay in the hospital for oncology and general surgery consult.  He is high risk for gastric outlet obstruction and needs to stay on full liquid diet.      I was called and notified by nursing that patient is wanting to leave AGAINST MEDICAL ADVICE. I went and discussed with the patient and his family including his daughters and wife at bedside and the rest of his family.  Ron Wang was with me at bedside.  I went over the EGD findings and GI recommendations in details.  Patient is with full  decision-making capacity. Patient and family both fully understanding of the plan and findings.  Patient was adamant about leaving the hospital AGAINST MEDICAL ADVICE despite my advice and counseling and his family talking to him to stay.  Patient fully understands the risks and possible complications including worsening GI bleed, high risk for gastric outlet obstruction or even death.  Patient and family both understands that he can come back to the ER at any time if he changed his mind, not better or worse.  Patient instructed on following up with his PCP for further referrals to oncology and general surgery and monitoring of his anemia.  Advised patient to stay on full liquid diet per GI recommendations.    Vital Signs:    Temp:  [97.6 °F (36.4 °C)-98.5 °F (36.9 °C)] 98 °F (36.7 °C)  Heart Rate:  [63-86] 72  Resp:  [14-16] 16  BP: (102-122)/(68-89) 116/78    Physical Exam:    General Appearance:  Alert and cooperative.  No acute distress.   Head:  Atraumatic and normocephalic.   Eyes: Conjunctivae and sclerae normal, no icterus. No pallor.   Ears:  Ears with no abnormalities noted.   Throat: No oral lesions, no thrush, oral mucosa moist.   Neck: Supple, trachea midline, no thyromegaly.   Back:   No kyphoscoliosis present. No tenderness to palpation.   Lungs:   Breath sounds heard bilaterally equally.  No crackles or wheezing. No Pleural rub or bronchial breathing.   Heart:  Normal S1 and S2, no murmur, no gallop, no rub. No JVD.   Abdomen:   Normal bowel sounds, no masses, no organomegaly. Soft, nontender, nondistended, no rebound tenderness.   Extremities: Supple, no edema, no cyanosis, no clubbing.   Pulses: Pulses palpable bilaterally.   Skin: No bleeding or rash.   Neurologic: Alert and oriented x 3. No facial asymmetry. Moves all four limbs. No tremors.     Pertinent Lab Results:     Results from last 7 days   Lab Units 11/20/24  0625 11/19/24  1151   SODIUM mmol/L 138 137   POTASSIUM mmol/L 3.7 4.1    CHLORIDE mmol/L 105 101   CO2 mmol/L 23.3 22.8   BUN mg/dL 11 10   CREATININE mg/dL 0.64* 0.64*   CALCIUM mg/dL 8.5* 9.0   BILIRUBIN mg/dL  --  0.4   ALK PHOS U/L  --  88   ALT (SGPT) U/L  --  6   AST (SGOT) U/L  --  16   GLUCOSE mg/dL 95 92     Results from last 7 days   Lab Units 11/20/24  0625 11/19/24  1151   WBC 10*3/mm3 5.40 5.62   HEMOGLOBIN g/dL 11.8* 13.6   HEMATOCRIT % 36.9* 43.0   PLATELETS 10*3/mm3 156 171                     Results from last 7 days   Lab Units 11/19/24  1151   LIPASE U/L 31               Pertinent Radiology Results:    Imaging Results (All)       Procedure Component Value Units Date/Time    CT Abdomen Pelvis With Contrast [[id number]] Collected: 11/19/24 1421     Updated: 11/19/24 1423    Narrative:      PROCEDURE: CT ABDOMEN PELVIS W CONTRAST-     TECHNIQUE: IV contrast enhanced exam     HISTORY:  rlq abd pain, dark stool, and coffee color vomit     COMPARISON: None.     FINDINGS:     ABDOMEN: Benign cyst is seen of the anterior liver dome. Remaining solid  organs are unremarkable. Marked wall thickening is noted of the gastric  pylorus. The anterior wall measures up to 34 mm in thickness over a 50  mm length. This could be neoplastic or inflammatory. This results in  narrowing of the pyloric channel. EGD correlation recommended.     There is no significant gastric distention. Small bowel is unremarkable.  No adenopathy is present.     PELVIS: Moderate sigmoid diverticulosis is noted. Appendix is not  visualized but no secondary findings of appendicitis are present.  Bladder and prostate are unremarkable. Surgical changes of bilateral  lower inguinal hernia repair are noted.       Impression:      Severe wall thickening of the gastric pylorus over a 5 cm  length. Morphology favors neoplastic etiology over inflammatory. EGD  highly recommended. No associated findings of gastric outlet  obstruction.     This study was performed with techniques to keep radiation doses as low  as  reasonably achievable (ALARA). Individualized dose reduction  techniques using automated exposure control or adjustment of vA and/or  kV according to the patient size were employed.     This report was signed and finalized on 11/19/2024 2:21 PM by Kevin Plummer MD.               Echo:      Condition on Discharge:      Stable.    Code status during the hospital stay:    Code Status and Medical Interventions: No CPR (Do Not Attempt to Resuscitate); Limited Support; No intubation (DNI)   Ordered at: 11/19/24 1749     Medical Intervention Limits:    No intubation (DNI)     Code Status (Patient has no pulse and is not breathing):    No CPR (Do Not Attempt to Resuscitate)     Medical Interventions (Patient has pulse or is breathing):    Limited Support     Discharge Disposition:    Left Against Medical Advice    Discharge Medications:       Discharge Medications        ASK your doctor about these medications        Instructions Start Date   Ambien 5 MG tablet  Generic drug: zolpidem   10 mg, Oral, Nightly PRN      clonazePAM 0.5 MG tablet  Commonly known as: KlonoPIN   0.5 mg, 2 Times Daily PRN      empagliflozin 10 MG tablet tablet  Commonly known as: JARDIANCE   25 mg, Oral, Daily      escitalopram 5 MG tablet  Commonly known as: LEXAPRO   20 mg, Oral, Daily      linaclotide 290 MCG capsule capsule  Commonly known as: LINZESS   145 mcg, Every Morning Before Breakfast      metFORMIN 500 MG tablet  Commonly known as: GLUCOPHAGE   1,000 mg, Oral, 2 Times Daily With Meals      metoprolol succinate XL 25 MG 24 hr tablet  Commonly known as: TOPROL-XL   25 mg, Oral, Daily      omeprazole 20 MG capsule  Commonly known as: priLOSEC   20 mg, Oral, Daily      pantoprazole 40 MG EC tablet  Commonly known as: PROTONIX   40 mg, Daily      rosuvastatin 5 MG tablet  Commonly known as: CRESTOR   10 mg, Oral, Nightly      sucralfate 1 g tablet  Commonly known as: CARAFATE   1 g, Oral, 4 Times Daily             Discharge Diet:   Full  liquid diet    Activity at Discharge:   As tolerated    Follow-up Appointments:     Follow-up Information       Bernardino Santiago MD Follow up.    Specialty: Family Medicine  Why: Office closed at time of discharge, please call and make a 1 week follow up tomorrow.  Contact information:  [place] [address]  [phone]               Garrett Mccord MD Follow up.    Specialty: Gastroenterology  Why: Office closed at time of discharge, please call and make a 2 week appointment for hospital follow up  Contact information:  [address]  [address]  [facility] [address]  [phone]                           Future Appointments   Date Time Provider Department Center   11/25/2024  1:00 PM Rishi Orozco MD [facility]     Test Results Pending at Discharge:    Pending Results       Procedure [Order ID] Specimen - Date/Time    TISSUE EXAM, [facility] ([facility]) [[id number]] Collected: 11/20/24 1435    Specimen: Tissue from Gastric, Antrum                  Melonie Hdz MD  11/20/24  17:12 EST    Time: I spent 34 minutes on this discharge activity which included: face-to-face encounter with the patient, reviewing the data in the system, coordination of the care with the nursing staff as well as consultants, documentation, and entering orders.     Dictated utilizing Dragon dictation.

## 2024-11-20 NOTE — ANESTHESIA PREPROCEDURE EVALUATION
Anesthesia Evaluation     Patient summary reviewed and Nursing notes reviewed   NPO Solid Status: > 8 hours  NPO Liquid Status: > 8 hours           Airway   Mallampati: II  TM distance: >3 FB  Neck ROM: full  Possible difficult intubation  Dental      Pulmonary - normal exam   Cardiovascular - normal exam    (+) hypertension well controlled less than 2 medications      Neuro/Psych  GI/Hepatic/Renal/Endo    (+) GI bleeding upper active bleeding, diabetes mellitus type 2 well controlled    Musculoskeletal     Abdominal  - normal exam   Substance History      OB/GYN          Other                    Anesthesia Plan    ASA 3     MAC       Anesthetic plan, risks, benefits, and alternatives have been provided, discussed and informed consent has been obtained with: patient.  Pre-procedure education provided  Plan discussed with CRNA.    CODE STATUS:    Medical Intervention Limits: No intubation (DNI)  Code Status (Patient has no pulse and is not breathing): No CPR (Do Not Attempt to Resuscitate)  Medical Interventions (Patient has pulse or is breathing): Limited Support

## 2024-11-20 NOTE — PLAN OF CARE
Goal Outcome Evaluation:  Plan of Care Reviewed With: patient, family        Progress: no change  Outcome Evaluation: Pt seen for OT evaluation today.  Pt lives at home with his wife and is normally independent with self care and mobility tasks.  Pt does report having some falls in the past, but family feels the falls may be related to his BP or his heart.  Pt was able to sit eob independently, stood independently and walked 164' with sba.  Pt is independent with self care tasks and feels he is at his baseline and has no skilled OT needs at this time.  OT will sign off at this time.    Anticipated Discharge Disposition (OT): home

## 2024-11-20 NOTE — PLAN OF CARE
Goal Outcome Evaluation:  Plan of Care Reviewed With: patient, spouse        Progress: no change  Outcome Evaluation: Pt participated in PT initial evaluation this date. Pt presents supine in bed with family at side, agreeable to PT evaluation. Pt AOx4, denies reports of pain at rest. Pt reports at baseline, he lives at home with his wife in a ranch w/ basement; 4 ROSHAN. Pt reports he is (I) with all household/community mobility at baseline without AD. Pt reports frequent falls at home. Pt performed supine > sit on EOB with mod (I). Pt performed STS transfer with SBA and no AD. Pt ambulated x164' with no AD and SBA, no LOB noted. BLE MMT/AROM WFL. Following evaluation, pt left seated in bedside chair with call light and all needs within reach, family at side. At this time, pt appears to be at functional baseline. Skilled PT intervention is not indicated. Once medically stable, recommend pt to d/c home with support from family and use a RW for community mobility to reduce risk of falls. PT will sign off.    Anticipated Discharge Disposition (PT): home with assist

## 2024-11-20 NOTE — THERAPY DISCHARGE NOTE
Patient Name: Raffaele Blanco  : 1950    MRN: [id number]                              Today's Date: 2024       Admit Date: 2024    Visit Dx:     ICD-10-CM ICD-9-CM   1. Gastric wall thickening  K31.89 537.89   2. Coffee ground emesis  K92.0 578.0   3. Gastric mass  K31.89 537.89   4. Abnormal finding on GI tract imaging  R93.3 793.4     Patient Active Problem List   Diagnosis    Gastric mass    Gastric wall thickening    Coffee ground emesis    Abnormal finding on GI tract imaging     Past Medical History:   Diagnosis Date    Diabetes mellitus     Hypertension      Past Surgical History:   Procedure Laterality Date    COLONOSCOPY      FRACTURE SURGERY      HERNIA REPAIR        General Information       Row Name 24 1409          Physical Therapy Time and Intention    Document Type discharge evaluation/summary  -     Mode of Treatment physical therapy  -       Row Name 24 1409          General Information    Patient Profile Reviewed yes  -     Prior Level of Function independent:;all household mobility;community mobility  -     Existing Precautions/Restrictions fall  -     Barriers to Rehab none identified  -       Row Name 24 1409          Living Environment    People in Home spouse  -       Row Name 24 1409          Home Main Entrance    Number of Stairs, Main Entrance four  -     Stair Railings, Main Entrance railing on left side (ascending)  -       Row Name 24 1409          Stairs Within Home, Primary    Stairs, Within Home, Primary ranch w/ basement, (I) with stairs at baseline  -     Number of Stairs, Within Home, Primary twelve  -       Row Name 24 1409          Cognition    Orientation Status (Cognition) oriented x 4  -       Row Name 24 1409          Safety Issues/Impairments Affecting Functional Mobility    Safety Issues Affecting Function (Mobility) safety precaution awareness  -     Impairments Affecting Function  (Mobility) endurance/activity tolerance  -               User Key  (r) = Recorded By, (t) = Taken By, (c) = Cosigned By      Initials Name Provider Type     Linda Lucio PT Physical Therapist                   Mobility       Row Name 11/20/24 1410          Bed Mobility    Bed Mobility bed mobility (all) activities  -     All Activities, [place] (Bed Mobility) modified independence  -     Assistive Device (Bed Mobility) head of bed elevated  -       Row Name 11/20/24 1410          Sit-Stand Transfer    Sit-Stand [place] (Transfers) standby assist  -     Assistive Device (Sit-Stand Transfers) other (see comments)  -     Comment, (Sit-Stand Transfer) gait belt  -       Row Name 11/20/24 1410          Gait/Stairs (Locomotion)    [place] Level (Gait) standby assist  -     Assistive Device (Gait) other (see comments)  gait belt, HHA  -     Patient was able to Ambulate yes  -     Distance in Feet (Gait) 164  -     Deviations/Abnormal Patterns (Gait) bilateral deviations;mouna decreased;festinating/shuffling;base of support, narrow;gait speed decreased  -     Bilateral Gait Deviations forward flexed posture;heel strike decreased  -     [place] Level (Stairs) not tested  -               User Key  (r) = Recorded By, (t) = Taken By, (c) = Cosigned By      Initials Name Provider Type     Linda Lucio PT Physical Therapist                   Obj/Interventions       Row Name 11/20/24 1410          Range of Motion Comprehensive    General Range of Motion bilateral lower extremity ROM L  -       Row Name 11/20/24 1410          Strength Comprehensive (MMT)    General Manual Muscle Testing (MMT) Assessment no strength deficits identified  -     Comment, General Manual Muscle Testing (MMT) Assessment BLE MMT L  -       Row Name 11/20/24 1410          Balance    Balance Assessment sitting static balance;sitting dynamic balance;standing static balance;standing dynamic  balance  -     Static Sitting Balance modified independence  -     Dynamic Sitting Balance modified independence  -     Position, Sitting Balance unsupported;sitting edge of bed  -     Static Standing Balance standby assist  -     Dynamic Standing Balance standby assist  -     Position/Device Used, Standing Balance unsupported  -       Row Name 11/20/24 1410          Sensory Assessment (Somatosensory)    Sensory Assessment (Somatosensory) LE sensation intact  -               User Key  (r) = Recorded By, (t) = Taken By, (c) = Cosigned By      Initials Name Provider Type    Linda Gan PT Physical Therapist                   Goals/Plan    No documentation.                  Clinical Impression       Row Name 11/20/24 1413          Pain    Pretreatment Pain Rating 0/10 - no pain  -     Posttreatment Pain Rating 0/10 - no pain  -       Row Name 11/20/24 1413          Plan of Care Review    Plan of Care Reviewed With patient;spouse  -     Progress no change  -     Outcome Evaluation Pt participated in PT initial evaluation this date. Pt presents supine in bed with family at side, agreeable to PT evaluation. Pt AOx4, denies reports of pain at rest. Pt reports at baseline, he lives at home with his wife in a ranch w/ basement; 4 ROSHAN. Pt reports he is (I) with all household/community mobility at baseline without AD. Pt reports frequent falls at home. Pt performed supine > sit on EOB with mod (I). Pt performed STS transfer with SBA and no AD. Pt ambulated x164' with no AD and SBA, no LOB noted. BLE MMT/AROM WFL. Following evaluation, pt left seated in bedside chair with call light and all needs within reach, family at side. At this time, pt appears to be at functional baseline. Skilled PT intervention is not indicated. Once medically stable, recommend pt to d/c home with support from family and use a RW for community mobility to reduce risk of falls. PT will sign off.  -       Row Name 11/20/24  1413          Therapy Assessment/Plan (PT)    Criteria for Skilled Interventions Met (PT) no;does not meet criteria for skilled intervention  -     Therapy Frequency (PT) evaluation only  -       Row Name 11/20/24 1413          Vital Signs    Pre Systolic BP Rehab 102  -HW     Pre Treatment Diastolic BP 68  -HW     Pretreatment Heart Rate (beats/min) 70  -HW     Pre SpO2 (%) 99  -HW     O2 Delivery Pre Treatment room air  -HW     O2 Delivery Intra Treatment room air  -HW     O2 Delivery Post Treatment room air  -HW     Pre Patient Position Supine  -HW     Intra Patient Position Standing  -HW     Post Patient Position Sitting  -       Row Name 11/20/24 1413          Positioning and Restraints    Pre-Treatment Position in bed  -HW     Post Treatment Position chair  -HW     In Chair sitting;call light within reach;encouraged to call for assist;with family/caregiver;notified nsg  -               User Key  (r) = Recorded By, (t) = Taken By, (c) = Cosigned By      Initials Name Provider Type     Linda Lucio, PT Physical Therapist                   Outcome Measures       Row Name 11/20/24 1422          How much help from another person do you currently need...    Turning from your back to your side while in flat bed without using bedrails? 4  -HW     Moving from lying on back to sitting on the side of a flat bed without bedrails? 4  -HW     Moving to and from a bed to a chair (including a wheelchair)? 4  -HW     Standing up from a chair using your arms (e.g., wheelchair, bedside chair)? 4  -HW     Climbing 3-5 steps with a railing? 3  -HW     To walk in hospital room? 4  -     AM-PAC 6 Clicks Score (PT) 23  -     Highest Level of Mobility Goal 7 --> Walk 25 feet or more  -       Row Name 11/20/24 1422 11/20/24 1322       Functional Assessment    Outcome Measure Options AM-PAC 6 Clicks Basic Mobility (PT)  - AM-PAC 6 Clicks Daily Activity (OT)  -              User Key  (r) = Recorded By, (t) = Taken By,  (c) = Cosigned By      Initials Name Provider Type    Mikaela Matthew Occupational Therapist     Linda Lucio PT Physical Therapist                  Physical Therapy Education       Title: PT OT SLP Therapies (In Progress)       Topic: Physical Therapy (In Progress)       Point: Mobility training (Done)       Learning Progress Summary            Patient Acceptance, E,TB, VU by  at 11/20/2024 1422    Comment: Role of PT during IP admission   Family Acceptance, E,TB, VU by  at 11/20/2024 1422    Comment: Role of PT during IP admission                      Point: Home exercise program (Not Started)       Learner Progress:  Not documented in this visit.              Point: Body mechanics (Not Started)       Learner Progress:  Not documented in this visit.              Point: Precautions (Not Started)       Learner Progress:  Not documented in this visit.                              User Key       Initials Effective Dates Name Provider Type Discipline     11/29/23 -  Linda Lucio PT Physical Therapist PT                  PT Recommendation and Plan     Progress: no change  Outcome Evaluation: Pt participated in PT initial evaluation this date. Pt presents supine in bed with family at side, agreeable to PT evaluation. Pt AOx4, denies reports of pain at rest. Pt reports at baseline, he lives at home with his wife in a ranch w/ basement; 4 ROSHAN. Pt reports he is (I) with all household/community mobility at baseline without AD. Pt reports frequent falls at home. Pt performed supine > sit on EOB with mod (I). Pt performed STS transfer with SBA and no AD. Pt ambulated x164' with no AD and SBA, no LOB noted. BLE MMT/AROM WFL. Following evaluation, pt left seated in bedside chair with call light and all needs within reach, family at side. At this time, pt appears to be at functional baseline. Skilled PT intervention is not indicated. Once medically stable, recommend pt to d/c home with support from family and use a RW  for community mobility to reduce risk of falls. PT will sign off.     Time Calculation:   PT Evaluation Complexity  History, PT Evaluation Complexity: 1-2 personal factors and/or comorbidities  Examination of Body Systems (PT Eval Complexity): 1-2 elements  Clinical Presentation (PT Evaluation Complexity): stable  Clinical Decision Making (PT Evaluation Complexity): low complexity  Overall Complexity (PT Evaluation Complexity): low complexity     PT Charges       Row Name 11/20/24 1423             Time Calculation    Start Time 0946  -HW      PT Received On 11/20/24  -HW         Untimed Charges    PT Eval/Re-eval Minutes 41  -HW         Total Minutes    Untimed Charges Total Minutes 41  -HW       Total Minutes 41  -HW                User Key  (r) = Recorded By, (t) = Taken By, (c) = Cosigned By      Initials Name Provider Type    Linda Gan PT Physical Therapist                  Therapy Charges for Today       Code Description Service Date Service Provider Modifiers Qty    [id number]  PT EVAL LOW COMPLEXITY 3 11/20/2024 Linda Lucio PT GP 1            PT G-Codes  Outcome Measure Options: AM-PAC 6 Clicks Basic Mobility (PT)  AM-PAC 6 Clicks Score (PT): 23  AM-PAC 6 Clicks Score (OT): 24    PT Discharge Summary  Anticipated Discharge Disposition (PT): home with assist    Linda Lucio PT  11/20/2024

## 2024-11-22 NOTE — CASE MANAGEMENT/SOCIAL WORK
Case Management Discharge Note      Final Note: Pt left AMA.    Provided Post Acute Provider List?: N/A  N/A Provider List Comment: Patient plans to return home; no new needs at this time  Provided Post Acute Provider Quality & Resource List?: N/A  N/A Quality & Resource List Comment: Patient plans to return home; no new needs at this time    Selected Continued Care - Discharged on 11/20/2024 Admission date: 11/19/2024 - Discharge disposition: Left Against Medical Advice      Destination    No services have been selected for the patient.                Durable Medical Equipment    No services have been selected for the patient.                Dialysis/Infusion    No services have been selected for the patient.                Home Medical Care    No services have been selected for the patient.                Therapy    No services have been selected for the patient.                Community Resources    No services have been selected for the patient.                Community & DME    No services have been selected for the patient.                    Transportation Services  Private: Car    Final Discharge Disposition Code: 07 - left AMA

## 2024-11-22 NOTE — PROGRESS NOTES
Patient: Raffaele Blacno    YOB: 1950    Date: 11/25/2024    Primary Care Provider: Bernardino Santiago MD    Chief Complaint   Patient presents with    Abdominal Pain       SUBJECTIVE:    History of present illness: Patient recently seen in the emergency room at [facility] with melanotic stool as well as coffee-ground emesis.  He was found to have a gastric mass and EGD now confirms malignancy.    The following portions of the patient's history were reviewed and updated as appropriate: allergies, current medications, past family history, past medical history, past social history, past surgical history and problem list.      Review of Systems   Constitutional:  Negative for chills, fever and unexpected weight change.   HENT:  Negative for trouble swallowing and voice change.    Eyes:  Negative for visual disturbance.   Respiratory:  Negative for apnea, cough, chest tightness, shortness of breath and wheezing.    Cardiovascular:  Negative for chest pain, palpitations and leg swelling.   Gastrointestinal:  Positive for abdominal distention, abdominal pain, anal bleeding, blood in stool, constipation, diarrhea, nausea and vomiting. Negative for rectal pain.   Endocrine: Negative for cold intolerance and heat intolerance.   Genitourinary:  Negative for difficulty urinating, dysuria, flank pain, scrotal swelling and testicular pain.   Musculoskeletal:  Negative for back pain, gait problem and joint swelling.   Skin:  Negative for color change, rash and wound.   Neurological:  Negative for dizziness, syncope, speech difficulty, weakness, numbness and headaches.   Hematological:  Negative for adenopathy. Does not bruise/bleed easily.   Psychiatric/Behavioral:  Negative for confusion. The patient is not nervous/anxious.          Allergies:  No Known Allergies    Medications:    Current Outpatient Medications:     clonazePAM (KlonoPIN) 0.5 MG tablet, Take 1 tablet by mouth 2 (Two) Times a Day As  Needed for Anxiety., Disp: , Rfl:     empagliflozin (JARDIANCE) 10 MG tablet tablet, Take 2.5 tablets by mouth Daily., Disp: , Rfl:     escitalopram (LEXAPRO) 5 MG tablet, Take 4 tablets by mouth Daily., Disp: , Rfl:     ferrous sulfate 325 (65 FE) MG tablet, Take 1 tablet by mouth Daily With Breakfast., Disp: , Rfl:     HYDROcodone-acetaminophen (NORCO)  MG per tablet, Take 1 tablet by mouth Every 6 (Six) Hours As Needed., Disp: , Rfl:     linaclotide (LINZESS) 290 MCG capsule capsule, Take 145 mcg by mouth Every Morning Before Breakfast., Disp: , Rfl:     metFORMIN (GLUCOPHAGE) 500 MG tablet, Take 2 tablets by mouth 2 (Two) Times a Day With Meals., Disp: , Rfl:     metoprolol succinate XL (TOPROL-XL) 25 MG 24 hr tablet, Take 1 tablet by mouth Daily., Disp: , Rfl:     omeprazole (priLOSEC) 20 MG capsule, Take 1 capsule by mouth Daily., Disp: , Rfl:     pantoprazole (PROTONIX) 40 MG EC tablet, Take 1 tablet by mouth Daily., Disp: , Rfl:     rosuvastatin (CRESTOR) 5 MG tablet, Take 2 tablets by mouth Every Night., Disp: , Rfl:     sucralfate (CARAFATE) 1 g tablet, Take 1 tablet by mouth 4 (Four) Times a Day., Disp: , Rfl:     zolpidem (Ambien) 5 MG tablet, Take 2 tablets by mouth At Night As Needed for Sleep., Disp: , Rfl:     History:  Past Medical History:   Diagnosis Date    Anemia     Asthma     Clotting disorder     Colon polyp     Diabetes mellitus     Diverticulitis of colon 11/24    Fissure, anal     GI (gastrointestinal bleed)     Hypertension     Pulmonary arterial hypertension     Rectal bleeding        Past Surgical History:   Procedure Laterality Date    BREAST BIOPSY      COLONOSCOPY      ENDOSCOPY N/A 11/20/2024    Procedure: ESOPHAGOGASTRODUODENOSCOPY WITH BIOPSY;  Surgeon: Garrett Mccord MD;  Location: [facility] ENDOSCOPY;  Service: Gastroenterology;  Laterality: N/A;    FRACTURE SURGERY      HEMORRHOIDECTOMY      HERNIA REPAIR         Family History   Problem Relation Age of Onset    Asthma  "Mother     Depression Mother     Diabetes Father     Hearing loss Sister        Social History     Tobacco Use    Smoking status: Never    Smokeless tobacco: Never   Vaping Use    Vaping status: Never Used   Substance Use Topics    Alcohol use: Never    Drug use: Never        OBJECTIVE:    Vital Signs:   Vitals:    11/25/24 1307   BP: 127/89   Pulse: 61   SpO2: 98%   Weight: 65.8 kg (145 lb)   Height: 170.2 cm (67\")       Physical Exam:       General Appearance:    Alert, cooperative, in no acute distress   Head:    Normocephalic, without obvious abnormality, atraumatic   Eyes:            Normal.  No scleral icterus.  PERRLA    Lungs:   A few rhonchi but otherwise clear    Heart:    Regular rhythm and normal rate,    Abdomen:   Soft and nontender nondistended.   Extremities:   Moves all extremities well, no edema, no cyanosis, no             redness   Skin:   No bleeding, bruising or rash   Neurologic:   Normal without gross deficits.   Psychiatric: No evidence of depression or anxiety          Results Review:   I reviewed the patient's new clinical results.  I reviewed the CT scan including films and I agree with the interpretation    Review of Systems was reviewed and confirmed as accurate as documented by the MA.    ASSESSMENT/PLAN:    1. Malignant neoplasm of pyloric antrum        Patient with a gastric malignancy.  I will refer for attempted resection.  It does appear somewhat large on CT scan however.  Also, he will be seeing oncology and may require neoadjuvant treatment.  In the event that he requires a port I discussed this procedure to he and his family.  They understand the procedure as well as the risks of bleeding, infection as well as pneumothorax.  If needed they wish to proceed with that and they can just call me to schedule.          Electronically signed by Rishi Orozco MD  11/25/24          "

## 2024-11-25 ENCOUNTER — OFFICE VISIT (OUTPATIENT)
Dept: SURGERY | Facility: CLINIC | Age: 74
End: 2024-11-25
Payer: MEDICARE

## 2024-11-25 VITALS
WEIGHT: 145 LBS | HEART RATE: 61 BPM | OXYGEN SATURATION: 98 % | HEIGHT: 67 IN | SYSTOLIC BLOOD PRESSURE: 127 MMHG | DIASTOLIC BLOOD PRESSURE: 89 MMHG | BODY MASS INDEX: 22.76 KG/M2

## 2024-11-25 DIAGNOSIS — C16.3 MALIGNANT NEOPLASM OF PYLORIC ANTRUM: Primary | ICD-10-CM

## 2024-11-25 PROCEDURE — 1160F RVW MEDS BY RX/DR IN RCRD: CPT | Performed by: SURGERY

## 2024-11-25 PROCEDURE — 1159F MED LIST DOCD IN RCRD: CPT | Performed by: SURGERY

## 2024-11-25 PROCEDURE — 99204 OFFICE O/P NEW MOD 45 MIN: CPT | Performed by: SURGERY

## 2024-11-25 RX ORDER — FERROUS SULFATE 325(65) MG
325 TABLET ORAL
COMMUNITY
Start: 2024-07-30

## 2024-11-25 RX ORDER — HYDROCODONE BITARTRATE AND ACETAMINOPHEN 10; 325 MG/1; MG/1
1 TABLET ORAL EVERY 6 HOURS PRN
COMMUNITY
Start: 2024-08-09

## 2024-11-26 DIAGNOSIS — C16.9 ADENOCARCINOMA OF STOMACH: ICD-10-CM

## 2024-11-26 DIAGNOSIS — K31.89 GASTRIC MASS: Primary | ICD-10-CM

## 2024-11-26 LAB — REF LAB TEST METHOD: NORMAL

## 2024-11-26 NOTE — PROGRESS NOTES
Please give the patient the following message:  ----- Results -----  Biopsy from this gastric mass reveals invasive moderately differentiated adenocarcinoma.    I see that he has already seen general surgery for this.  He needs to see oncology for this.

## 2024-12-30 LAB — REF LAB TEST METHOD: NORMAL

## (undated) DEVICE — HYBRID CO2 TUBING/CAP SET FOR OLYMPUS® SCOPES & CO2 SOURCE: Brand: ERBE

## (undated) DEVICE — FRCP BX RADJAW4 NDL 2.8 240 STD OG

## (undated) DEVICE — Device

## (undated) DEVICE — ENDOSCOPY PORT CONNECTOR FOR OLYMPUS® SCOPES: Brand: ERBE

## (undated) DEVICE — VLV SXN AIR/H2O ORCAPOD3 1P/U STRL

## (undated) DEVICE — CONMED SCOPE SAVER BITE BLOCK, 20X27 MM: Brand: SCOPE SAVER